# Patient Record
Sex: MALE | Race: WHITE
[De-identification: names, ages, dates, MRNs, and addresses within clinical notes are randomized per-mention and may not be internally consistent; named-entity substitution may affect disease eponyms.]

---

## 2019-01-03 ENCOUNTER — HOSPITAL ENCOUNTER (EMERGENCY)
Dept: HOSPITAL 8 - ED | Age: 54
Discharge: HOME | End: 2019-01-03
Payer: SELF-PAY

## 2019-01-03 VITALS — DIASTOLIC BLOOD PRESSURE: 93 MMHG | SYSTOLIC BLOOD PRESSURE: 145 MMHG

## 2019-01-03 VITALS — WEIGHT: 258.16 LBS | HEIGHT: 74 IN | BODY MASS INDEX: 33.13 KG/M2

## 2019-01-03 DIAGNOSIS — F12.10: ICD-10-CM

## 2019-01-03 DIAGNOSIS — F17.200: ICD-10-CM

## 2019-01-03 DIAGNOSIS — L89.893: Primary | ICD-10-CM

## 2019-01-03 PROCEDURE — 99283 EMERGENCY DEPT VISIT LOW MDM: CPT

## 2019-03-07 ENCOUNTER — HOSPITAL ENCOUNTER (EMERGENCY)
Dept: HOSPITAL 8 - ED | Age: 54
Discharge: HOME | End: 2019-03-07
Payer: MEDICAID

## 2019-03-07 VITALS — HEIGHT: 74 IN | BODY MASS INDEX: 33.84 KG/M2 | WEIGHT: 263.67 LBS

## 2019-03-07 VITALS — SYSTOLIC BLOOD PRESSURE: 140 MMHG | DIASTOLIC BLOOD PRESSURE: 78 MMHG

## 2019-03-07 DIAGNOSIS — E11.65: Primary | ICD-10-CM

## 2019-03-07 DIAGNOSIS — L89.893: ICD-10-CM

## 2019-03-07 DIAGNOSIS — F17.200: ICD-10-CM

## 2019-03-07 LAB
ACETONE, SERUM: (no result) MG/DL
ALBUMIN SERPL-MCNC: 3 G/DL (ref 3.4–5)
ALP SERPL-CCNC: 104 U/L (ref 45–117)
ALT SERPL-CCNC: 27 U/L (ref 12–78)
ANION GAP SERPL CALC-SCNC: 8 MMOL/L (ref 5–15)
BASOPHILS # BLD AUTO: 0.03 X10^3/UL (ref 0–0.1)
BASOPHILS NFR BLD AUTO: 0 % (ref 0–1)
BILIRUB SERPL-MCNC: 0.6 MG/DL (ref 0.2–1)
CALCIUM SERPL-MCNC: 8.3 MG/DL (ref 8.5–10.1)
CHLORIDE SERPL-SCNC: 103 MMOL/L (ref 98–107)
CREAT SERPL-MCNC: 1.14 MG/DL (ref 0.7–1.3)
CULTURE INDICATED?: NO
EOSINOPHIL # BLD AUTO: 0.13 X10^3/UL (ref 0–0.4)
EOSINOPHIL NFR BLD AUTO: 2 % (ref 1–7)
ERYTHROCYTE [DISTWIDTH] IN BLOOD BY AUTOMATED COUNT: 12.9 % (ref 9.4–14.8)
LYMPHOCYTES # BLD AUTO: 1.47 X10^3/UL (ref 1–3.4)
LYMPHOCYTES NFR BLD AUTO: 19 % (ref 22–44)
MCH RBC QN AUTO: 31.4 PG (ref 27.5–34.5)
MCHC RBC AUTO-ENTMCNC: 33.7 G/DL (ref 33.2–36.2)
MCV RBC AUTO: 93.2 FL (ref 81–97)
MD: NO
MICROSCOPIC: (no result)
MONOCYTES # BLD AUTO: 0.52 X10^3/UL (ref 0.2–0.8)
MONOCYTES NFR BLD AUTO: 7 % (ref 2–9)
NEUTROPHILS # BLD AUTO: 5.74 X10^3/UL (ref 1.8–6.8)
NEUTROPHILS NFR BLD AUTO: 73 % (ref 42–75)
O2 FLOW: (no result) L/MIN
PH BLDV: 7.42 PH (ref 7.32–7.42)
PLATELET # BLD AUTO: 169 X10^3/UL (ref 130–400)
PMV BLD AUTO: 8 FL (ref 7.4–10.4)
PROT SERPL-MCNC: 6.8 G/DL (ref 6.4–8.2)
RBC # BLD AUTO: 4.97 X10^6/UL (ref 4.38–5.82)

## 2019-03-07 PROCEDURE — 80053 COMPREHEN METABOLIC PANEL: CPT

## 2019-03-07 PROCEDURE — 82010 KETONE BODYS QUAN: CPT

## 2019-03-07 PROCEDURE — 82803 BLOOD GASES ANY COMBINATION: CPT

## 2019-03-07 PROCEDURE — 99284 EMERGENCY DEPT VISIT MOD MDM: CPT

## 2019-03-07 PROCEDURE — 96372 THER/PROPH/DIAG INJ SC/IM: CPT

## 2019-03-07 PROCEDURE — 85025 COMPLETE CBC W/AUTO DIFF WBC: CPT

## 2019-03-07 PROCEDURE — 82962 GLUCOSE BLOOD TEST: CPT

## 2019-03-07 PROCEDURE — 83930 ASSAY OF BLOOD OSMOLALITY: CPT

## 2019-03-07 PROCEDURE — 81001 URINALYSIS AUTO W/SCOPE: CPT

## 2019-03-07 PROCEDURE — 73630 X-RAY EXAM OF FOOT: CPT

## 2019-03-07 PROCEDURE — 96360 HYDRATION IV INFUSION INIT: CPT

## 2019-03-07 PROCEDURE — 36415 COLL VENOUS BLD VENIPUNCTURE: CPT

## 2019-03-07 NOTE — NUR
R GREAT TOE WITH DEEP WOUND W/ CLEAR DRAINAGE. L GREAT TOE WITH SUPERFICIAL 
WOUND. PT REPORTS 'CHILLS'/N/V 'FIVE NIGHTS AGO', 'I HAVE HAD ULCERS ON MY FEET 
BEFORE SO I WASN'T THAT WORRIED ABOUT IT'. 

PT REPORTS 'I TRIED TO GET IN TO [facility], BUT ITS SO HARD TO SEE ANYONE THERE. I 
APPLIED FOR INSURANCE YESTERDAY SO I SHOULD BE ABLE TO GET BACK ON TOP OF 
CONTROLING MY SUGARS SOON'.

## 2019-03-07 NOTE — NUR
FIRST CONTACT WITH PT. NON-TOXIC APPEARING MALE, SITTING UP IN [facility]. PT 
REPORTS R LOW BACK PAIN RADIATING TO R LEG WITH MOVEMENT X FOUR DAYS. HX OF 
LBP, "THIS FEELS WORSE". DENIES URINARY SYMPTOMS/INCONTINENCE/TRAUMA. PT ALSO 
CO L TOE PAIN/DRAINAGE; HX OF DM W/ PREVIOUS ULCER AND TOE AMPUTATION. 

SO AT BEDSIDE. BP/SPO2 MONITOR IN PLACE.

-------------------------------------------------------------------------------

Addendum: 03/07/19 at 1719 by LWEGENER

-------------------------------------------------------------------------------

PT REPORTS "I KNOW WHY MY SUGARS WERE SO HIGH, I DRANK A PINT LAST NIGHT TO TRY 
TO HELP WITH THE PAIN". 

HX OF DM AND HTN. PT NON-COMPLIANT WITH MEDICATIONS X APPROX 9 MONTHS

## 2019-05-28 ENCOUNTER — HOME HEALTH ADMISSION (OUTPATIENT)
Dept: HOME HEALTH SERVICES | Facility: HOME HEALTHCARE | Age: 54
End: 2019-05-28
Payer: COMMERCIAL

## 2019-05-29 ENCOUNTER — HOME CARE VISIT (OUTPATIENT)
Dept: HOME HEALTH SERVICES | Facility: HOME HEALTHCARE | Age: 54
End: 2019-05-29
Payer: COMMERCIAL

## 2019-05-29 PROCEDURE — G0493 RN CARE EA 15 MIN HH/HOSPICE: HCPCS

## 2019-05-29 PROCEDURE — 665001 SOC-HOME HEALTH

## 2019-05-30 ENCOUNTER — HOME CARE VISIT (OUTPATIENT)
Dept: HOME HEALTH SERVICES | Facility: HOME HEALTHCARE | Age: 54
End: 2019-05-30
Payer: COMMERCIAL

## 2019-05-30 VITALS
TEMPERATURE: 98.6 F | SYSTOLIC BLOOD PRESSURE: 100 MMHG | RESPIRATION RATE: 18 BRPM | DIASTOLIC BLOOD PRESSURE: 60 MMHG | OXYGEN SATURATION: 94 % | HEART RATE: 99 BPM | WEIGHT: 230 LBS | BODY MASS INDEX: 29.52 KG/M2 | HEIGHT: 74 IN

## 2019-05-31 ENCOUNTER — HOME CARE VISIT (OUTPATIENT)
Dept: HOME HEALTH SERVICES | Facility: HOME HEALTHCARE | Age: 54
End: 2019-05-31
Payer: COMMERCIAL

## 2019-05-31 ENCOUNTER — ANTICOAGULATION MONITORING (OUTPATIENT)
Dept: MEDICAL GROUP | Facility: PHYSICIAN GROUP | Age: 54
End: 2019-05-31

## 2019-05-31 PROCEDURE — G0299 HHS/HOSPICE OF RN EA 15 MIN: HCPCS

## 2019-05-31 ASSESSMENT — ENCOUNTER SYMPTOMS
VOMITING: DENIES
DEBILITATING PAIN: 1
NAUSEA: DENIES
MENTAL STATUS CHANGE: 0

## 2019-05-31 ASSESSMENT — PATIENT HEALTH QUESTIONNAIRE - PHQ9
CLINICAL INTERPRETATION OF PHQ2 SCORE: 0
1. LITTLE INTEREST OR PLEASURE IN DOING THINGS: 00
2. FEELING DOWN, DEPRESSED, IRRITABLE, OR HOPELESS: 00

## 2019-05-31 ASSESSMENT — ACTIVITIES OF DAILY LIVING (ADL): OASIS_M1830: 03

## 2019-05-31 NOTE — PROGRESS NOTES
Received referral from [facility]. Medications reviewed. No clinically significant interactions noted.     Combination of ibuprofen with a DOAC should be limited as much as possible.  If continued use of a nonsteroidal anti-inflammatory is needed may want to consider one with a lower bleed risk such as meloxicam or possibly Celebrex.  Other medications for nerve pain and somatic pain will need to be discussed with her physician.

## 2019-06-03 ENCOUNTER — HOME CARE VISIT (OUTPATIENT)
Dept: HOME HEALTH SERVICES | Facility: HOME HEALTHCARE | Age: 54
End: 2019-06-03
Payer: COMMERCIAL

## 2019-06-03 VITALS
RESPIRATION RATE: 18 BRPM | SYSTOLIC BLOOD PRESSURE: 125 MMHG | TEMPERATURE: 98.1 F | DIASTOLIC BLOOD PRESSURE: 70 MMHG | OXYGEN SATURATION: 98 % | HEART RATE: 100 BPM

## 2019-06-03 VITALS
TEMPERATURE: 99.5 F | OXYGEN SATURATION: 98 % | RESPIRATION RATE: 17 BRPM | SYSTOLIC BLOOD PRESSURE: 138 MMHG | DIASTOLIC BLOOD PRESSURE: 70 MMHG | HEART RATE: 88 BPM

## 2019-06-03 VITALS
OXYGEN SATURATION: 98 % | HEART RATE: 88 BPM | RESPIRATION RATE: 17 BRPM | SYSTOLIC BLOOD PRESSURE: 138 MMHG | TEMPERATURE: 99.5 F | DIASTOLIC BLOOD PRESSURE: 90 MMHG

## 2019-06-03 PROCEDURE — G0493 RN CARE EA 15 MIN HH/HOSPICE: HCPCS

## 2019-06-03 PROCEDURE — G0151 HHCP-SERV OF PT,EA 15 MIN: HCPCS

## 2019-06-03 ASSESSMENT — ENCOUNTER SYMPTOMS
MENTAL STATUS CHANGE: 0
DEBILITATING PAIN: 1
VOMITING: DENIES
VOMITING: DENIES
NAUSEA: DENIES
NAUSEA: DENIES

## 2019-06-03 ASSESSMENT — ACTIVITIES OF DAILY LIVING (ADL): ADLS_COMMENTS: <!--EPICS-->PEND OT EVAL<!--EPICE-->

## 2019-06-04 ENCOUNTER — HOME CARE VISIT (OUTPATIENT)
Dept: HOME HEALTH SERVICES | Facility: HOME HEALTHCARE | Age: 54
End: 2019-06-04
Payer: COMMERCIAL

## 2019-06-05 ENCOUNTER — HOME CARE VISIT (OUTPATIENT)
Dept: HOME HEALTH SERVICES | Facility: HOME HEALTHCARE | Age: 54
End: 2019-06-05
Payer: COMMERCIAL

## 2019-06-06 ENCOUNTER — APPOINTMENT (OUTPATIENT)
Dept: RADIOLOGY | Facility: MEDICAL CENTER | Age: 54
DRG: 580 | End: 2019-06-06
Attending: EMERGENCY MEDICINE
Payer: COMMERCIAL

## 2019-06-06 ENCOUNTER — HOSPITAL ENCOUNTER (INPATIENT)
Facility: MEDICAL CENTER | Age: 54
LOS: 7 days | DRG: 580 | End: 2019-06-13
Attending: EMERGENCY MEDICINE | Admitting: HOSPITALIST
Payer: COMMERCIAL

## 2019-06-06 ENCOUNTER — HOME CARE VISIT (OUTPATIENT)
Dept: HOME HEALTH SERVICES | Facility: HOME HEALTHCARE | Age: 54
End: 2019-06-06
Payer: COMMERCIAL

## 2019-06-06 VITALS
HEART RATE: 111 BPM | OXYGEN SATURATION: 98 % | SYSTOLIC BLOOD PRESSURE: 136 MMHG | DIASTOLIC BLOOD PRESSURE: 78 MMHG | RESPIRATION RATE: 16 BRPM | TEMPERATURE: 98 F

## 2019-06-06 DIAGNOSIS — R53.1 WEAKNESS: ICD-10-CM

## 2019-06-06 DIAGNOSIS — L02.419 ABSCESS OF CALF: ICD-10-CM

## 2019-06-06 DIAGNOSIS — M00.9 PYOGENIC ARTHRITIS OF RIGHT KNEE JOINT, DUE TO UNSPECIFIED ORGANISM (HCC): ICD-10-CM

## 2019-06-06 DIAGNOSIS — D51.9 ANEMIA DUE TO VITAMIN B12 DEFICIENCY, UNSPECIFIED B12 DEFICIENCY TYPE: ICD-10-CM

## 2019-06-06 PROBLEM — D64.9 NORMOCHROMIC NORMOCYTIC ANEMIA: Status: ACTIVE | Noted: 2019-06-06

## 2019-06-06 PROBLEM — E11.9 DIABETES MELLITUS TYPE 2 IN NONOBESE (HCC): Status: ACTIVE | Noted: 2019-06-06

## 2019-06-06 PROBLEM — R00.0 TACHYCARDIA: Status: ACTIVE | Noted: 2019-06-06

## 2019-06-06 PROBLEM — Z72.0 TOBACCO ABUSE: Status: ACTIVE | Noted: 2019-06-06

## 2019-06-06 LAB
ALBUMIN SERPL BCP-MCNC: 3.6 G/DL (ref 3.2–4.9)
ALBUMIN/GLOB SERPL: 0.9 G/DL
ALP SERPL-CCNC: 75 U/L (ref 30–99)
ALT SERPL-CCNC: 8 U/L (ref 2–50)
ANION GAP SERPL CALC-SCNC: 10 MMOL/L (ref 0–11.9)
AST SERPL-CCNC: 9 U/L (ref 12–45)
BASOPHILS # BLD AUTO: 0.5 % (ref 0–1.8)
BASOPHILS # BLD: 0.05 K/UL (ref 0–0.12)
BILIRUB SERPL-MCNC: 0.3 MG/DL (ref 0.1–1.5)
BUN SERPL-MCNC: 17 MG/DL (ref 8–22)
CALCIUM SERPL-MCNC: 9.1 MG/DL (ref 8.5–10.5)
CHLORIDE SERPL-SCNC: 104 MMOL/L (ref 96–112)
CO2 SERPL-SCNC: 22 MMOL/L (ref 20–33)
CREAT SERPL-MCNC: 0.81 MG/DL (ref 0.5–1.4)
EKG IMPRESSION: NORMAL
EOSINOPHIL # BLD AUTO: 0.64 K/UL (ref 0–0.51)
EOSINOPHIL NFR BLD: 6.2 % (ref 0–6.9)
ERYTHROCYTE [DISTWIDTH] IN BLOOD BY AUTOMATED COUNT: 51.8 FL (ref 35.9–50)
GLOBULIN SER CALC-MCNC: 3.8 G/DL (ref 1.9–3.5)
GLUCOSE BLD-MCNC: 141 MG/DL (ref 65–99)
GLUCOSE SERPL-MCNC: 210 MG/DL (ref 65–99)
HCT VFR BLD AUTO: 34 % (ref 42–52)
HGB BLD-MCNC: 10.3 G/DL (ref 14–18)
IMM GRANULOCYTES # BLD AUTO: 0.03 K/UL (ref 0–0.11)
IMM GRANULOCYTES NFR BLD AUTO: 0.3 % (ref 0–0.9)
LACTATE BLD-SCNC: 1.6 MMOL/L (ref 0.5–2)
LYMPHOCYTES # BLD AUTO: 1.64 K/UL (ref 1–4.8)
LYMPHOCYTES NFR BLD: 15.8 % (ref 22–41)
MCH RBC QN AUTO: 27.7 PG (ref 27–33)
MCHC RBC AUTO-ENTMCNC: 30.3 G/DL (ref 33.7–35.3)
MCV RBC AUTO: 91.4 FL (ref 81.4–97.8)
MONOCYTES # BLD AUTO: 0.46 K/UL (ref 0–0.85)
MONOCYTES NFR BLD AUTO: 4.4 % (ref 0–13.4)
NEUTROPHILS # BLD AUTO: 7.58 K/UL (ref 1.82–7.42)
NEUTROPHILS NFR BLD: 72.8 % (ref 44–72)
NRBC # BLD AUTO: 0 K/UL
NRBC BLD-RTO: 0 /100 WBC
PLATELET # BLD AUTO: 426 K/UL (ref 164–446)
PMV BLD AUTO: 9.1 FL (ref 9–12.9)
POTASSIUM SERPL-SCNC: 4.1 MMOL/L (ref 3.6–5.5)
PROT SERPL-MCNC: 7.4 G/DL (ref 6–8.2)
RBC # BLD AUTO: 3.72 M/UL (ref 4.7–6.1)
SODIUM SERPL-SCNC: 136 MMOL/L (ref 135–145)
WBC # BLD AUTO: 10.4 K/UL (ref 4.8–10.8)

## 2019-06-06 PROCEDURE — 99407 BEHAV CHNG SMOKING > 10 MIN: CPT | Performed by: HOSPITALIST

## 2019-06-06 PROCEDURE — 82962 GLUCOSE BLOOD TEST: CPT | Mod: 91

## 2019-06-06 PROCEDURE — 80053 COMPREHEN METABOLIC PANEL: CPT

## 2019-06-06 PROCEDURE — 700111 HCHG RX REV CODE 636 W/ 250 OVERRIDE (IP): Performed by: EMERGENCY MEDICINE

## 2019-06-06 PROCEDURE — 770006 HCHG ROOM/CARE - MED/SURG/GYN SEMI*

## 2019-06-06 PROCEDURE — 700105 HCHG RX REV CODE 258: Performed by: EMERGENCY MEDICINE

## 2019-06-06 PROCEDURE — 96367 TX/PROPH/DG ADDL SEQ IV INF: CPT

## 2019-06-06 PROCEDURE — 99285 EMERGENCY DEPT VISIT HI MDM: CPT

## 2019-06-06 PROCEDURE — 85025 COMPLETE CBC W/AUTO DIFF WBC: CPT

## 2019-06-06 PROCEDURE — 700105 HCHG RX REV CODE 258: Performed by: HOSPITALIST

## 2019-06-06 PROCEDURE — 93971 EXTREMITY STUDY: CPT | Mod: RT

## 2019-06-06 PROCEDURE — G0493 RN CARE EA 15 MIN HH/HOSPICE: HCPCS

## 2019-06-06 PROCEDURE — 700111 HCHG RX REV CODE 636 W/ 250 OVERRIDE (IP): Performed by: HOSPITALIST

## 2019-06-06 PROCEDURE — 87040 BLOOD CULTURE FOR BACTERIA: CPT | Mod: 91

## 2019-06-06 PROCEDURE — 700117 HCHG RX CONTRAST REV CODE 255: Performed by: EMERGENCY MEDICINE

## 2019-06-06 PROCEDURE — 93005 ELECTROCARDIOGRAM TRACING: CPT

## 2019-06-06 PROCEDURE — 83605 ASSAY OF LACTIC ACID: CPT

## 2019-06-06 PROCEDURE — 73701 CT LOWER EXTREMITY W/DYE: CPT | Mod: RT

## 2019-06-06 PROCEDURE — 96365 THER/PROPH/DIAG IV INF INIT: CPT

## 2019-06-06 PROCEDURE — 96375 TX/PRO/DX INJ NEW DRUG ADDON: CPT

## 2019-06-06 PROCEDURE — 700102 HCHG RX REV CODE 250 W/ 637 OVERRIDE(OP): Performed by: HOSPITALIST

## 2019-06-06 PROCEDURE — 36415 COLL VENOUS BLD VENIPUNCTURE: CPT

## 2019-06-06 PROCEDURE — 73552 X-RAY EXAM OF FEMUR 2/>: CPT | Mod: RT

## 2019-06-06 PROCEDURE — A9270 NON-COVERED ITEM OR SERVICE: HCPCS | Performed by: HOSPITALIST

## 2019-06-06 PROCEDURE — 99223 1ST HOSP IP/OBS HIGH 75: CPT | Mod: 25 | Performed by: HOSPITALIST

## 2019-06-06 RX ORDER — BISACODYL 10 MG
10 SUPPOSITORY, RECTAL RECTAL
Status: DISCONTINUED | OUTPATIENT
Start: 2019-06-06 | End: 2019-06-13 | Stop reason: HOSPADM

## 2019-06-06 RX ORDER — AMOXICILLIN 250 MG
2 CAPSULE ORAL 2 TIMES DAILY
Status: DISCONTINUED | OUTPATIENT
Start: 2019-06-07 | End: 2019-06-13 | Stop reason: HOSPADM

## 2019-06-06 RX ORDER — LABETALOL HYDROCHLORIDE 5 MG/ML
10 INJECTION, SOLUTION INTRAVENOUS EVERY 4 HOURS PRN
Status: DISCONTINUED | OUTPATIENT
Start: 2019-06-06 | End: 2019-06-13 | Stop reason: HOSPADM

## 2019-06-06 RX ORDER — ONDANSETRON 2 MG/ML
4 INJECTION INTRAMUSCULAR; INTRAVENOUS EVERY 4 HOURS PRN
Status: DISCONTINUED | OUTPATIENT
Start: 2019-06-06 | End: 2019-06-13 | Stop reason: HOSPADM

## 2019-06-06 RX ORDER — POLYETHYLENE GLYCOL 3350 17 G/17G
1 POWDER, FOR SOLUTION ORAL
Status: DISCONTINUED | OUTPATIENT
Start: 2019-06-06 | End: 2019-06-13 | Stop reason: HOSPADM

## 2019-06-06 RX ORDER — GABAPENTIN 300 MG/1
600 CAPSULE ORAL 3 TIMES DAILY
Status: DISCONTINUED | OUTPATIENT
Start: 2019-06-06 | End: 2019-06-13 | Stop reason: HOSPADM

## 2019-06-06 RX ORDER — MORPHINE SULFATE 15 MG/1
15 TABLET, FILM COATED, EXTENDED RELEASE ORAL ONCE
Status: COMPLETED | OUTPATIENT
Start: 2019-06-06 | End: 2019-06-06

## 2019-06-06 RX ORDER — PROMETHAZINE HYDROCHLORIDE 12.5 MG/1
12.5-25 SUPPOSITORY RECTAL EVERY 4 HOURS PRN
Status: DISCONTINUED | OUTPATIENT
Start: 2019-06-06 | End: 2019-06-13 | Stop reason: HOSPADM

## 2019-06-06 RX ORDER — MORPHINE SULFATE 15 MG/1
15 TABLET, FILM COATED, EXTENDED RELEASE ORAL EVERY 12 HOURS
Status: DISCONTINUED | OUTPATIENT
Start: 2019-06-06 | End: 2019-06-13 | Stop reason: HOSPADM

## 2019-06-06 RX ORDER — NICOTINE 21 MG/24HR
21 PATCH, TRANSDERMAL 24 HOURS TRANSDERMAL
Status: DISCONTINUED | OUTPATIENT
Start: 2019-06-06 | End: 2019-06-13 | Stop reason: HOSPADM

## 2019-06-06 RX ORDER — MORPHINE SULFATE 4 MG/ML
4 INJECTION, SOLUTION INTRAMUSCULAR; INTRAVENOUS ONCE
Status: COMPLETED | OUTPATIENT
Start: 2019-06-06 | End: 2019-06-06

## 2019-06-06 RX ORDER — OXYCODONE AND ACETAMINOPHEN 10; 325 MG/1; MG/1
1 TABLET ORAL EVERY 4 HOURS PRN
Status: DISCONTINUED | OUTPATIENT
Start: 2019-06-06 | End: 2019-06-10

## 2019-06-06 RX ORDER — PROMETHAZINE HYDROCHLORIDE 25 MG/1
12.5-25 TABLET ORAL EVERY 4 HOURS PRN
Status: DISCONTINUED | OUTPATIENT
Start: 2019-06-06 | End: 2019-06-13 | Stop reason: HOSPADM

## 2019-06-06 RX ORDER — LISINOPRIL 20 MG/1
20 TABLET ORAL DAILY
Status: DISCONTINUED | OUTPATIENT
Start: 2019-06-07 | End: 2019-06-13 | Stop reason: HOSPADM

## 2019-06-06 RX ORDER — OXYCODONE AND ACETAMINOPHEN 10; 325 MG/1; MG/1
1 TABLET ORAL EVERY 4 HOURS PRN
Status: ON HOLD | COMMUNITY
End: 2019-06-13

## 2019-06-06 RX ORDER — ACETAMINOPHEN 325 MG/1
650 TABLET ORAL EVERY 6 HOURS PRN
Status: DISCONTINUED | OUTPATIENT
Start: 2019-06-06 | End: 2019-06-09

## 2019-06-06 RX ORDER — SODIUM CHLORIDE 9 MG/ML
INJECTION, SOLUTION INTRAVENOUS CONTINUOUS
Status: DISCONTINUED | OUTPATIENT
Start: 2019-06-06 | End: 2019-06-09

## 2019-06-06 RX ORDER — ONDANSETRON 2 MG/ML
4 INJECTION INTRAMUSCULAR; INTRAVENOUS ONCE
Status: COMPLETED | OUTPATIENT
Start: 2019-06-06 | End: 2019-06-06

## 2019-06-06 RX ORDER — ONDANSETRON 4 MG/1
4 TABLET, ORALLY DISINTEGRATING ORAL EVERY 4 HOURS PRN
Status: DISCONTINUED | OUTPATIENT
Start: 2019-06-06 | End: 2019-06-13 | Stop reason: HOSPADM

## 2019-06-06 RX ADMIN — ONDANSETRON 4 MG: 2 INJECTION INTRAMUSCULAR; INTRAVENOUS at 16:04

## 2019-06-06 RX ADMIN — IOHEXOL 100 ML: 350 INJECTION, SOLUTION INTRAVENOUS at 18:07

## 2019-06-06 RX ADMIN — VANCOMYCIN HYDROCHLORIDE 2600 MG: 100 INJECTION, POWDER, LYOPHILIZED, FOR SOLUTION INTRAVENOUS at 17:39

## 2019-06-06 RX ADMIN — MORPHINE SULFATE 4 MG: 4 INJECTION INTRAVENOUS at 16:05

## 2019-06-06 RX ADMIN — PIPERACILLIN AND TAZOBACTAM 3.38 G: 3; .375 INJECTION, POWDER, LYOPHILIZED, FOR SOLUTION INTRAVENOUS; PARENTERAL at 16:56

## 2019-06-06 RX ADMIN — MORPHINE SULFATE 15 MG: 15 TABLET, EXTENDED RELEASE ORAL at 19:44

## 2019-06-06 RX ADMIN — OXYCODONE HYDROCHLORIDE AND ACETAMINOPHEN 1 TABLET: 10; 325 TABLET ORAL at 21:43

## 2019-06-06 RX ADMIN — OXYCODONE HYDROCHLORIDE AND ACETAMINOPHEN 1 TABLET: 10; 325 TABLET ORAL at 17:34

## 2019-06-06 RX ADMIN — NICOTINE 21 MG: 21 PATCH, EXTENDED RELEASE TRANSDERMAL at 19:09

## 2019-06-06 RX ADMIN — SODIUM CHLORIDE: 9 INJECTION, SOLUTION INTRAVENOUS at 21:38

## 2019-06-06 RX ADMIN — CEFTRIAXONE 2 G: 2 INJECTION, POWDER, FOR SOLUTION INTRAMUSCULAR; INTRAVENOUS at 21:00

## 2019-06-06 RX ADMIN — GABAPENTIN 600 MG: 300 CAPSULE ORAL at 19:08

## 2019-06-06 ASSESSMENT — ENCOUNTER SYMPTOMS
MENTAL STATUS CHANGE: 0
DIARRHEA: 0
LOSS OF CONSCIOUSNESS: 0
GASTROINTESTINAL NEGATIVE: 1
CARDIOVASCULAR NEGATIVE: 1
VOMITING: 0
FEVER: 0
PALPITATIONS: 0
BLOOD IN STOOL: 0
PSYCHIATRIC NEGATIVE: 1
NEUROLOGICAL NEGATIVE: 1
CHILLS: 0
COUGH: 0
ABDOMINAL PAIN: 0
RESPIRATORY NEGATIVE: 1
EYES NEGATIVE: 1
BRUISES/BLEEDS EASILY: 0
DIAPHORESIS: 0
DIZZINESS: 0
HEARTBURN: 0
FOCAL WEAKNESS: 0
CONSTIPATION: 0
CONSTITUTIONAL NEGATIVE: 1
SEIZURES: 0
HEADACHES: 0
WHEEZING: 0
SHORTNESS OF BREATH: 0
DOUBLE VISION: 0
HEMOPTYSIS: 0
MYALGIAS: 1
NERVOUS/ANXIOUS: 0
NAUSEA: 0

## 2019-06-06 ASSESSMENT — PAIN DESCRIPTION - DESCRIPTORS: DESCRIPTORS: PRESSURE;SHOOTING

## 2019-06-06 ASSESSMENT — COGNITIVE AND FUNCTIONAL STATUS - GENERAL
STANDING UP FROM CHAIR USING ARMS: A LITTLE
CLIMB 3 TO 5 STEPS WITH RAILING: A LITTLE
DAILY ACTIVITIY SCORE: 24
SUGGESTED CMS G CODE MODIFIER MOBILITY: CJ
WALKING IN HOSPITAL ROOM: A LITTLE
MOVING FROM LYING ON BACK TO SITTING ON SIDE OF FLAT BED: A LITTLE
SUGGESTED CMS G CODE MODIFIER DAILY ACTIVITY: CH
MOBILITY SCORE: 20

## 2019-06-06 ASSESSMENT — LIFESTYLE VARIABLES
ALCOHOL_USE: NO
EVER_SMOKED: YES

## 2019-06-06 ASSESSMENT — PATIENT HEALTH QUESTIONNAIRE - PHQ9
SUM OF ALL RESPONSES TO PHQ9 QUESTIONS 1 AND 2: 0
2. FEELING DOWN, DEPRESSED, IRRITABLE, OR HOPELESS: NOT AT ALL
1. LITTLE INTEREST OR PLEASURE IN DOING THINGS: NOT AT ALL

## 2019-06-06 NOTE — ED NOTES
Med rec complete per medication list (returned)  Allergies reviewed    Patient stated he was at [facility] and had a pic line and received antibiotics    Patient was discharged with Levemir and Novolog but stated he doesn't use them due to not being trained on how to use it.     Out of both pain medications for a week, hasn't had a follow up appointment    Gabapentin 300 mg is prescribed TID but patient has been taking it as 600 mg TID due to pain being worse

## 2019-06-06 NOTE — ED TRIAGE NOTES
.  Chief Complaint   Patient presents with   • Leg Pain     right leg worsening pain and swelling, hx blood clot to rle 2 months on eliquis   • Leg Swelling     with redness   pt to triage with family in w/c. Pt limited ambulation with crutches. Pt advised by home health RN to come to ed for evaluation of increased swelling pain and redness. Pt has hinge knee brace d/t fx in femur. Was discharged from [facility] on 5/25 has since ran out of pain medication.

## 2019-06-07 ENCOUNTER — ANESTHESIA EVENT (OUTPATIENT)
Dept: SURGERY | Facility: MEDICAL CENTER | Age: 54
DRG: 580 | End: 2019-06-07
Payer: COMMERCIAL

## 2019-06-07 ENCOUNTER — ANESTHESIA (OUTPATIENT)
Dept: SURGERY | Facility: MEDICAL CENTER | Age: 54
DRG: 580 | End: 2019-06-07
Payer: COMMERCIAL

## 2019-06-07 PROBLEM — G89.29 CHRONIC BACK PAIN: Status: ACTIVE | Noted: 2019-06-07

## 2019-06-07 PROBLEM — F17.200 TOBACCO DEPENDENCE: Status: ACTIVE | Noted: 2019-06-06

## 2019-06-07 PROBLEM — I10 HYPERTENSION: Status: ACTIVE | Noted: 2019-06-07

## 2019-06-07 PROBLEM — M54.9 CHRONIC BACK PAIN: Status: ACTIVE | Noted: 2019-06-07

## 2019-06-07 PROBLEM — I82.409 DVT (DEEP VENOUS THROMBOSIS) (HCC): Status: ACTIVE | Noted: 2019-06-07

## 2019-06-07 LAB
ANION GAP SERPL CALC-SCNC: 7 MMOL/L (ref 0–11.9)
APPEARANCE FLD: NORMAL
BODY FLD TYPE: NORMAL
BUN SERPL-MCNC: 15 MG/DL (ref 8–22)
CALCIUM SERPL-MCNC: 8.1 MG/DL (ref 8.5–10.5)
CHLORIDE SERPL-SCNC: 106 MMOL/L (ref 96–112)
CO2 SERPL-SCNC: 22 MMOL/L (ref 20–33)
COLOR FLD: YELLOW
CREAT SERPL-MCNC: 0.71 MG/DL (ref 0.5–1.4)
CRYSTALS FLD MICRO: NORMAL
EOSINOPHIL NFR FLD: 6 %
ERYTHROCYTE [DISTWIDTH] IN BLOOD BY AUTOMATED COUNT: 53 FL (ref 35.9–50)
GLUCOSE BLD-MCNC: 126 MG/DL (ref 65–99)
GLUCOSE BLD-MCNC: 133 MG/DL (ref 65–99)
GLUCOSE BLD-MCNC: 164 MG/DL (ref 65–99)
GLUCOSE BLD-MCNC: 184 MG/DL (ref 65–99)
GLUCOSE SERPL-MCNC: 149 MG/DL (ref 65–99)
GRAM STN SPEC: NORMAL
GRAM STN SPEC: NORMAL
HCT VFR BLD AUTO: 27.8 % (ref 42–52)
HGB BLD-MCNC: 8.3 G/DL (ref 14–18)
LYMPHOCYTES NFR FLD: 18 %
MCH RBC QN AUTO: 27.6 PG (ref 27–33)
MCHC RBC AUTO-ENTMCNC: 29.9 G/DL (ref 33.7–35.3)
MCV RBC AUTO: 92.4 FL (ref 81.4–97.8)
MONONUC CELLS NFR FLD: 32 %
NEUTROPHILS NFR FLD: 44 %
PLATELET # BLD AUTO: 294 K/UL (ref 164–446)
PMV BLD AUTO: 9.1 FL (ref 9–12.9)
POTASSIUM SERPL-SCNC: 3.8 MMOL/L (ref 3.6–5.5)
RBC # BLD AUTO: 3.01 M/UL (ref 4.7–6.1)
RBC # FLD: NORMAL CELLS/UL
SIGNIFICANT IND 70042: NORMAL
SIGNIFICANT IND 70042: NORMAL
SITE SITE: NORMAL
SITE SITE: NORMAL
SODIUM SERPL-SCNC: 135 MMOL/L (ref 135–145)
SOURCE SOURCE: NORMAL
SOURCE SOURCE: NORMAL
WBC # BLD AUTO: 7.7 K/UL (ref 4.8–10.8)
WBC # FLD: 6116 CELLS/UL

## 2019-06-07 PROCEDURE — 700102 HCHG RX REV CODE 250 W/ 637 OVERRIDE(OP): Performed by: ANESTHESIOLOGY

## 2019-06-07 PROCEDURE — 700111 HCHG RX REV CODE 636 W/ 250 OVERRIDE (IP): Performed by: HOSPITALIST

## 2019-06-07 PROCEDURE — 160002 HCHG RECOVERY MINUTES (STAT): Performed by: ORTHOPAEDIC SURGERY

## 2019-06-07 PROCEDURE — 501330 HCHG SET, CYSTO IRRIG TUBING: Performed by: ORTHOPAEDIC SURGERY

## 2019-06-07 PROCEDURE — 700101 HCHG RX REV CODE 250: Performed by: ANESTHESIOLOGY

## 2019-06-07 PROCEDURE — 500440 HCHG DRESSING, STERILE ROLL (KERLIX): Performed by: ORTHOPAEDIC SURGERY

## 2019-06-07 PROCEDURE — 700111 HCHG RX REV CODE 636 W/ 250 OVERRIDE (IP): Performed by: ANESTHESIOLOGY

## 2019-06-07 PROCEDURE — 501838 HCHG SUTURE GENERAL: Performed by: ORTHOPAEDIC SURGERY

## 2019-06-07 PROCEDURE — 700102 HCHG RX REV CODE 250 W/ 637 OVERRIDE(OP): Performed by: HOSPITALIST

## 2019-06-07 PROCEDURE — 0K9S0ZZ DRAINAGE OF RIGHT LOWER LEG MUSCLE, OPEN APPROACH: ICD-10-PCS | Performed by: ORTHOPAEDIC SURGERY

## 2019-06-07 PROCEDURE — 160027 HCHG SURGERY MINUTES - 1ST 30 MINS LEVEL 2: Performed by: ORTHOPAEDIC SURGERY

## 2019-06-07 PROCEDURE — 82962 GLUCOSE BLOOD TEST: CPT

## 2019-06-07 PROCEDURE — 0KDS0ZZ EXTRACTION OF RIGHT LOWER LEG MUSCLE, OPEN APPROACH: ICD-10-PCS | Performed by: ORTHOPAEDIC SURGERY

## 2019-06-07 PROCEDURE — A9270 NON-COVERED ITEM OR SERVICE: HCPCS | Performed by: ANESTHESIOLOGY

## 2019-06-07 PROCEDURE — 160036 HCHG PACU - EA ADDL 30 MINS PHASE I: Performed by: ORTHOPAEDIC SURGERY

## 2019-06-07 PROCEDURE — 160048 HCHG OR STATISTICAL LEVEL 1-5: Performed by: ORTHOPAEDIC SURGERY

## 2019-06-07 PROCEDURE — 87186 SC STD MICRODIL/AGAR DIL: CPT

## 2019-06-07 PROCEDURE — A9270 NON-COVERED ITEM OR SERVICE: HCPCS | Performed by: HOSPITALIST

## 2019-06-07 PROCEDURE — 87070 CULTURE OTHR SPECIMN AEROBIC: CPT

## 2019-06-07 PROCEDURE — 36415 COLL VENOUS BLD VENIPUNCTURE: CPT

## 2019-06-07 PROCEDURE — 160009 HCHG ANES TIME/MIN: Performed by: ORTHOPAEDIC SURGERY

## 2019-06-07 PROCEDURE — A6454 SELF-ADHER BAND W>=3" <5"/YD: HCPCS | Performed by: ORTHOPAEDIC SURGERY

## 2019-06-07 PROCEDURE — 80048 BASIC METABOLIC PNL TOTAL CA: CPT

## 2019-06-07 PROCEDURE — 700105 HCHG RX REV CODE 258: Performed by: HOSPITALIST

## 2019-06-07 PROCEDURE — 500423 HCHG DRESSING, ABD COMBINE: Performed by: ORTHOPAEDIC SURGERY

## 2019-06-07 PROCEDURE — 0S9C3ZX DRAINAGE OF RIGHT KNEE JOINT, PERCUTANEOUS APPROACH, DIAGNOSTIC: ICD-10-PCS | Performed by: ORTHOPAEDIC SURGERY

## 2019-06-07 PROCEDURE — 99233 SBSQ HOSP IP/OBS HIGH 50: CPT | Performed by: INTERNAL MEDICINE

## 2019-06-07 PROCEDURE — 89051 BODY FLUID CELL COUNT: CPT

## 2019-06-07 PROCEDURE — 85027 COMPLETE CBC AUTOMATED: CPT

## 2019-06-07 PROCEDURE — 700105 HCHG RX REV CODE 258: Performed by: ANESTHESIOLOGY

## 2019-06-07 PROCEDURE — 770006 HCHG ROOM/CARE - MED/SURG/GYN SEMI*

## 2019-06-07 PROCEDURE — 89060 EXAM SYNOVIAL FLUID CRYSTALS: CPT

## 2019-06-07 PROCEDURE — 87205 SMEAR GRAM STAIN: CPT | Mod: 91

## 2019-06-07 PROCEDURE — 501445 HCHG STAPLER, SKIN DISP: Performed by: ORTHOPAEDIC SURGERY

## 2019-06-07 PROCEDURE — 160035 HCHG PACU - 1ST 60 MINS PHASE I: Performed by: ORTHOPAEDIC SURGERY

## 2019-06-07 PROCEDURE — 87077 CULTURE AEROBIC IDENTIFY: CPT

## 2019-06-07 PROCEDURE — 500881 HCHG PACK, EXTREMITY: Performed by: ORTHOPAEDIC SURGERY

## 2019-06-07 PROCEDURE — A6223 GAUZE >16<=48 NO W/SAL W/O B: HCPCS | Performed by: ORTHOPAEDIC SURGERY

## 2019-06-07 PROCEDURE — 160038 HCHG SURGERY MINUTES - EA ADDL 1 MIN LEVEL 2: Performed by: ORTHOPAEDIC SURGERY

## 2019-06-07 PROCEDURE — 87075 CULTR BACTERIA EXCEPT BLOOD: CPT

## 2019-06-07 RX ORDER — MEPERIDINE HYDROCHLORIDE 25 MG/ML
6.25 INJECTION INTRAMUSCULAR; INTRAVENOUS; SUBCUTANEOUS
Status: DISCONTINUED | OUTPATIENT
Start: 2019-06-07 | End: 2019-06-07 | Stop reason: HOSPADM

## 2019-06-07 RX ORDER — ONDANSETRON 2 MG/ML
4 INJECTION INTRAMUSCULAR; INTRAVENOUS
Status: DISCONTINUED | OUTPATIENT
Start: 2019-06-07 | End: 2019-06-07 | Stop reason: HOSPADM

## 2019-06-07 RX ORDER — HALOPERIDOL 5 MG/ML
1 INJECTION INTRAMUSCULAR
Status: DISCONTINUED | OUTPATIENT
Start: 2019-06-07 | End: 2019-06-07 | Stop reason: HOSPADM

## 2019-06-07 RX ORDER — OXYCODONE HCL 5 MG/5 ML
10 SOLUTION, ORAL ORAL
Status: COMPLETED | OUTPATIENT
Start: 2019-06-07 | End: 2019-06-07

## 2019-06-07 RX ORDER — SODIUM CHLORIDE, SODIUM LACTATE, POTASSIUM CHLORIDE, CALCIUM CHLORIDE 600; 310; 30; 20 MG/100ML; MG/100ML; MG/100ML; MG/100ML
INJECTION, SOLUTION INTRAVENOUS
Status: DISCONTINUED | OUTPATIENT
Start: 2019-06-07 | End: 2019-06-07 | Stop reason: SURG

## 2019-06-07 RX ORDER — DIPHENHYDRAMINE HYDROCHLORIDE 50 MG/ML
12.5 INJECTION INTRAMUSCULAR; INTRAVENOUS
Status: DISCONTINUED | OUTPATIENT
Start: 2019-06-07 | End: 2019-06-07 | Stop reason: HOSPADM

## 2019-06-07 RX ORDER — SODIUM CHLORIDE, SODIUM LACTATE, POTASSIUM CHLORIDE, CALCIUM CHLORIDE 600; 310; 30; 20 MG/100ML; MG/100ML; MG/100ML; MG/100ML
INJECTION, SOLUTION INTRAVENOUS CONTINUOUS
Status: DISCONTINUED | OUTPATIENT
Start: 2019-06-07 | End: 2019-06-07 | Stop reason: HOSPADM

## 2019-06-07 RX ORDER — OXYCODONE HCL 5 MG/5 ML
5 SOLUTION, ORAL ORAL
Status: COMPLETED | OUTPATIENT
Start: 2019-06-07 | End: 2019-06-07

## 2019-06-07 RX ADMIN — PROPOFOL 200 MG: 10 INJECTION, EMULSION INTRAVENOUS at 11:04

## 2019-06-07 RX ADMIN — NICOTINE 21 MG: 21 PATCH, EXTENDED RELEASE TRANSDERMAL at 05:37

## 2019-06-07 RX ADMIN — INSULIN HUMAN 2 UNITS: 100 INJECTION, SOLUTION PARENTERAL at 17:54

## 2019-06-07 RX ADMIN — MIDAZOLAM HYDROCHLORIDE 2 MG: 1 INJECTION, SOLUTION INTRAMUSCULAR; INTRAVENOUS at 10:50

## 2019-06-07 RX ADMIN — SODIUM CHLORIDE, POTASSIUM CHLORIDE, SODIUM LACTATE AND CALCIUM CHLORIDE: 600; 310; 30; 20 INJECTION, SOLUTION INTRAVENOUS at 10:56

## 2019-06-07 RX ADMIN — SODIUM CHLORIDE, POTASSIUM CHLORIDE, SODIUM LACTATE AND CALCIUM CHLORIDE 1000 ML: 600; 310; 30; 20 INJECTION, SOLUTION INTRAVENOUS at 12:45

## 2019-06-07 RX ADMIN — MORPHINE SULFATE 15 MG: 15 TABLET, EXTENDED RELEASE ORAL at 05:37

## 2019-06-07 RX ADMIN — MORPHINE SULFATE 15 MG: 15 TABLET, EXTENDED RELEASE ORAL at 17:02

## 2019-06-07 RX ADMIN — GABAPENTIN 600 MG: 300 CAPSULE ORAL at 05:36

## 2019-06-07 RX ADMIN — FENTANYL CITRATE 100 MCG: 50 INJECTION, SOLUTION INTRAMUSCULAR; INTRAVENOUS at 11:04

## 2019-06-07 RX ADMIN — CEFTRIAXONE 2 G: 2 INJECTION, POWDER, FOR SOLUTION INTRAMUSCULAR; INTRAVENOUS at 17:02

## 2019-06-07 RX ADMIN — OXYCODONE HYDROCHLORIDE AND ACETAMINOPHEN 1 TABLET: 10; 325 TABLET ORAL at 01:44

## 2019-06-07 RX ADMIN — FENTANYL CITRATE 50 MCG: 50 INJECTION INTRAMUSCULAR; INTRAVENOUS at 12:22

## 2019-06-07 RX ADMIN — MEPERIDINE HYDROCHLORIDE 6.25 MG: 25 INJECTION INTRAMUSCULAR; INTRAVENOUS; SUBCUTANEOUS at 12:41

## 2019-06-07 RX ADMIN — OXYCODONE HYDROCHLORIDE AND ACETAMINOPHEN 1 TABLET: 10; 325 TABLET ORAL at 23:46

## 2019-06-07 RX ADMIN — SODIUM CHLORIDE: 9 INJECTION, SOLUTION INTRAVENOUS at 13:44

## 2019-06-07 RX ADMIN — ONDANSETRON 4 MG: 2 INJECTION INTRAMUSCULAR; INTRAVENOUS at 11:20

## 2019-06-07 RX ADMIN — LIDOCAINE HYDROCHLORIDE 50 MG: 20 INJECTION, SOLUTION INTRAVENOUS at 11:04

## 2019-06-07 RX ADMIN — OXYCODONE HYDROCHLORIDE AND ACETAMINOPHEN 1 TABLET: 10; 325 TABLET ORAL at 19:39

## 2019-06-07 RX ADMIN — FENTANYL CITRATE 50 MCG: 50 INJECTION INTRAMUSCULAR; INTRAVENOUS at 12:24

## 2019-06-07 RX ADMIN — OXYCODONE HYDROCHLORIDE AND ACETAMINOPHEN 1 TABLET: 10; 325 TABLET ORAL at 15:18

## 2019-06-07 RX ADMIN — OXYCODONE HYDROCHLORIDE AND ACETAMINOPHEN 1 TABLET: 10; 325 TABLET ORAL at 07:07

## 2019-06-07 RX ADMIN — VANCOMYCIN HYDROCHLORIDE 1800 MG: 100 INJECTION, POWDER, LYOPHILIZED, FOR SOLUTION INTRAVENOUS at 17:49

## 2019-06-07 RX ADMIN — LISINOPRIL 20 MG: 20 TABLET ORAL at 05:37

## 2019-06-07 RX ADMIN — VANCOMYCIN HYDROCHLORIDE 1800 MG: 100 INJECTION, POWDER, LYOPHILIZED, FOR SOLUTION INTRAVENOUS at 05:40

## 2019-06-07 RX ADMIN — OXYCODONE HYDROCHLORIDE 10 MG: 5 SOLUTION ORAL at 12:32

## 2019-06-07 RX ADMIN — GABAPENTIN 600 MG: 300 CAPSULE ORAL at 17:02

## 2019-06-07 RX ADMIN — MEPERIDINE HYDROCHLORIDE 6.25 MG: 25 INJECTION INTRAMUSCULAR; INTRAVENOUS; SUBCUTANEOUS at 12:45

## 2019-06-07 ASSESSMENT — ENCOUNTER SYMPTOMS
COUGH: 0
ABDOMINAL PAIN: 0
SINUS PAIN: 0
SENSORY CHANGE: 0
FEVER: 0
SHORTNESS OF BREATH: 0
SORE THROAT: 0
NAUSEA: 0
CONSTIPATION: 0
WEAKNESS: 0
INSOMNIA: 0
DEPRESSION: 0
VOMITING: 0
WHEEZING: 0
DIZZINESS: 0
PALPITATIONS: 0
BRUISES/BLEEDS EASILY: 0
SPEECH CHANGE: 0
DIARRHEA: 0
TINGLING: 1
CHILLS: 0
HEADACHES: 0
NERVOUS/ANXIOUS: 0
FOCAL WEAKNESS: 0

## 2019-06-07 ASSESSMENT — PAIN SCALES - GENERAL: PAIN_LEVEL: 3

## 2019-06-07 NOTE — ASSESSMENT & PLAN NOTE
S/p I&D 6/7 with hemovac placement.   Cultures + MSSA. Covering with IV ancef while inpatient. Will transition to PO augmentin [X2 Weeks] at discharge.   Ortho planning for hemovac d/c once drainage < 30 cc/24h.

## 2019-06-07 NOTE — PROGRESS NOTES
Transport here to take pt. To PreOp. Pt. Had a couple questions about surgery so consents were not signed.

## 2019-06-07 NOTE — PROGRESS NOTES
Hospital Medicine Daily Progress Note    Date of Service  6/7/2019    Chief Complaint  Right leg pain, swelling, redness    Hospital Course   Mr. Pérez is a 53-year-old male who presented to the emergency department on 6/6/2019 with complaints of worsening right leg pain, swelling, and erythema. Of note, he was diagnosed with a blood clot approximately 2 months ago and has been maintained on Eliquis in addition to being in a hinged knee brace due to femur fracture. He was advised by his home health RN to come into the hospital for evaluation of his leg.  Ultrasound of the affected leg showed a calf mass most consistent with deep abscess so the patient was placed on IV ceftriaxone and vancomycin, and orthopedic surgery was consulted.  A CT was done and showed a 15.9 cm abscess in the posterior upper calf with associated moderate right knee effusion and synovial hyperenhancement consistent with septic arthritis. On 6/7/19 he was taken for I&D of the right leg by Dr. Harris. Synovial fluid was sent for analysis and cultures are pending. Infectious disease was also consulted.       Interval Problem Update  Seen postoperatively. S/p I&D right leg today. Surgical dressing in place to RLE so was unable to visualize the leg. Hemovac drain present, small amount of serosang drainage present. Complains of severe pain in his right leg, hasn't had pain meds since returning from the operating room. Bedside RN notified.     No leukocytosis on morning labs H/H 10.3->8.3. BMP unremarkable.   -164.    Afebrile overnight, HR 80s-90s, BP 100s-teens, O2 saturations % on 2 L/min of oxygen via nasal cannula.    Intraoperative culture was sent & is pending.   Blood cultures in process.    WBAT RLE.     PT/OT pending.     Consultants/Specialty  Orthopedic surgery  Infectious disease    Code Status  Full code    Disposition  Clinical for now.  Recently discharged from [facility] with outpatient physical therapy and home  health.  Adamantly refusing to return to [facility] if SNF is ultimately recommended.     Review of Systems  Review of Systems   Constitutional: Negative for chills, fever and malaise/fatigue.   HENT: Negative for congestion, sinus pain and sore throat.    Respiratory: Negative for cough, shortness of breath and wheezing.    Cardiovascular: Positive for leg swelling. Negative for chest pain and palpitations.   Gastrointestinal: Negative for abdominal pain, constipation, diarrhea, nausea and vomiting.   Genitourinary: Negative for dysuria, frequency, hematuria and urgency.   Musculoskeletal: Positive for joint pain (right knee, severe).   Skin: Negative for itching and rash.   Neurological: Positive for tingling (BLE numbness at baseline, more pronounced in his feet). Negative for dizziness, sensory change, speech change, focal weakness, weakness and headaches.   Endo/Heme/Allergies: Does not bruise/bleed easily.   Psychiatric/Behavioral: Negative for depression. The patient is not nervous/anxious and does not have insomnia.    All other systems reviewed and are negative.     Physical Exam  Temp:  [36.1 °C (97 °F)-37.1 °C (98.8 °F)] 36.4 °C (97.5 °F)  Pulse:  [] 115  Resp:  [11-23] 18  BP: ()/(59-86) 108/60  SpO2:  [94 %-100 %] 98 %    Physical Exam   Constitutional: He is oriented to person, place, and time. He appears well-developed and well-nourished. He is active and cooperative. He appears ill (chronically ill-appearing). No distress. Nasal cannula in place.   HENT:   Head: Normocephalic and atraumatic.   Eyes: Pupils are equal, round, and reactive to light. Conjunctivae and EOM are normal. Right eye exhibits no discharge. Left eye exhibits no discharge. No scleral icterus.   Neck: Normal range of motion. Neck supple.   Cardiovascular: Normal rate, regular rhythm, normal heart sounds and intact distal pulses.  Exam reveals no gallop and no friction rub.    No murmur heard.  Pulmonary/Chest: Effort  normal. No accessory muscle usage or stridor. No respiratory distress. He has decreased breath sounds in the right lower field and the left lower field. He has no wheezes. He has no rhonchi. He has no rales.   Abdominal: Soft. Bowel sounds are normal. He exhibits no distension and no abdominal bruit. There is no tenderness. There is no rigidity and no guarding.   Musculoskeletal: Normal range of motion. He exhibits edema (RLE).   Neurological: He is alert and oriented to person, place, and time. GCS eye subscore is 4. GCS verbal subscore is 5. GCS motor subscore is 6.   Skin: Skin is warm and dry. He is not diaphoretic. No erythema. No pallor.        Psychiatric: He has a normal mood and affect. His speech is normal and behavior is normal. Judgment and thought content normal. Cognition and memory are normal.   Nursing note and vitals reviewed.    Fluids    Intake/Output Summary (Last 24 hours) at 06/07/19 1624  Last data filed at 06/07/19 1430   Gross per 24 hour   Intake              720 ml   Output             1255 ml   Net             -535 ml     Laboratory  Recent Labs      06/06/19   1431  06/07/19   0308   WBC  10.4  7.7   RBC  3.72*  3.01*   HEMOGLOBIN  10.3*  8.3*   HEMATOCRIT  34.0*  27.8*   MCV  91.4  92.4   MCH  27.7  27.6   MCHC  30.3*  29.9*   RDW  51.8*  53.0*   PLATELETCT  426  294   MPV  9.1  9.1     Recent Labs      06/06/19   1431  06/07/19   0308   SODIUM  136  135   POTASSIUM  4.1  3.8   CHLORIDE  104  106   CO2  22  22   GLUCOSE  210*  149*   BUN  17  15   CREATININE  0.81  0.71   CALCIUM  9.1  8.1*     Imaging  US-EXTREMITY VENOUS LOWER UNILAT RIGHT   Final Result      CT-EXTREMITY, LOWER WITH RIGHT   Final Result      1.  A 15.9 cm abscess in the posterior upper calf as described.   2.  Associated moderate knee effusion with synovial hyperenhancement, consistent with septic arthritis.   3.  No cortical erosion/destruction to suggest osteomyelitis.   4.  No acute fracture or dislocation.         DX-FEMUR-2+ RIGHT   Final Result      Mild right hip osteoarthrosis. No acute fracture or dislocation.         Assessment/Plan  * Abscess of calf- (present on admission)   Assessment & Plan    S/p I&D today.   Continue IV vanco & ceftriaxone for now pending evaluation by infectious disease.   Intraop cultures pending.   Continue pain control.      Pyogenic arthritis of right knee joint (HCC)- (present on admission)   Assessment & Plan    Currently on IV vancomycin & ceftriaxone. ID consulted.   Intraop cultures pending.   Continue pain control.      DVT (deep venous thrombosis) (HCC)- (present on admission)   Assessment & Plan    Maintained on eliquis PTA. Will resume once cleared by surgery, usually at least 5 days from the day of surgery.   DVT prophylaxis can usually be resumed within 36 hours, per Giuliano doyle PA, but will wait on their clearance to start it postop.   SCD to the LLE.     Diabetes mellitus type 2 in nonobese ([facility])- (present on admission)   Assessment & Plan    -164 overnight. Maintained on metformin & glipizide at home PTA. Continue holding for now.   Continue accuchecks ACHS with SSI as required.   Continue diabetic diet.   Obtain updated A1C in a.m.  Hypoglycemia protocol in place if needed.     Chronic back pain- (present on admission)   Assessment & Plan    Maintained on scheduled MS Contin & prn flexeril, motrin, & percocet at home.   Continue MS Contin & prn percocet. Adjust as necessary.   Spoke with the bedside RN & patient about getting consistent pain intervention, including non-pharmacologic modalities.   Continue to monitor closely.   Ideally would like to see pain controlled tonight so the patient can participate in PT/OT tomorrow a.m.     Hypertension- (present on admission)   Assessment & Plan    SBP overnight 100s-teens.  Continue home lisinopril.  Trend SBP's per unit protocol.     Tobacco dependence- (present on admission)   Assessment & Plan    Continue to encourage the  importance of cessation.   Nicotine replacement protocol in place.     Tachycardia- (present on admission)   Assessment & Plan    Resolved then developed again postoperatively.   Will continue IV fluids for now but is more likely related to his pain. Discussed more consistent pain control with the bedside RN. Also encouraged nonpharmacologic techniques of pain management.     Normochromic normocytic anemia- (present on admission)   Assessment & Plan    Anemia workup with tomorrow's labs.   H/H stable for now.         VTE prophylaxis: SCD's. Currently holding Eliquis.  Per doyle Nobles, NOAC's usually held for 5 days postop & prophylactic AC usually held for 36 hours. Will wait for their ok to start/resume.

## 2019-06-07 NOTE — OR NURSING
Called Dr. Marinelli for PACU orders, no orders for pain meds, PT w/ c/o 10/10 pain, denies nausea.  Will have Dr. Marinelli paged for orders.

## 2019-06-07 NOTE — PROGRESS NOTES
"Pharmacy Kinetics 53 y.o. male on vancomycin day # 2   2019    Currently on Vancomycin 1,800mg iv q12hr (0600, 1800)    Indication for Treatment: SSTI w/abscess     Pertinent history per medical record: Admitted on 2019 for R Calf pain and swelling following recent hairline fracture with DVT at Rehab. CT scan  revealed 15.9 cm abscess in the posterior upper calf as described. Empiric antibiotics initiated and ID consulted.     Other antibiotics: Ceftriaxone 2g IV q24h     Allergies: Patient has no known allergies.     List concerns for renal function: Diabetes (metformin on hold)     Pertinent cultures to date:   : Blood cultures = NGTD   : R knee synovial fluid = IN process   : R leg wound culture = IN process     MRSA nares swab if pneumonia is a concern (ordered/positive/negative/n-a): n/a     Recent Labs      19   1431  19   0308   WBC  10.4  7.7   NEUTSPOLYS  72.80*   --      Recent Labs      19   1431  19   0308   BUN  17  15   CREATININE  0.81  0.71   ALBUMIN  3.6   --      No results for input(s): VANCOTROUGH, VANCOPEAK, VANCORANDOM in the last 72 hours.  Intake/Output Summary (Last 24 hours) at 19 1350  Last data filed at 19 1245   Gross per 24 hour   Intake              720 ml   Output              855 ml   Net             -135 ml      /67   Pulse 79   Temp 37 °C (98.6 °F) (Temporal)   Resp 16   Ht 1.88 m (6' 2.02\")   Wt 104.2 kg (229 lb 11.5 oz)   SpO2 98%  Temp (24hrs), Av.6 °C (97.8 °F), Min:36.1 °C (97 °F), Max:37.1 °C (98.8 °F)      A/P   1. Vancomycin dose change: No.   2. Next vancomycin level: Tomorrow,  at 0530   3. Goal trough: 12 - 16 mcg/mL   4. Comments: I&D of abscess today. Renal indices are stable following vancomycin initiation. Plan for steady state trough level tomorrow morning. Patient is moderate risk for accumulation - anticipate a normal to high volume of distribution. ID consulting - continue current " coverage. Cultures in process.     Netta Faulkner, PharmD

## 2019-06-07 NOTE — ASSESSMENT & PLAN NOTE
Was on eliquis outpt.   Waiting on official surgical clearance from surgery on starting of AC.   I discussed with orthopedics PA [Rudy], can start A/C Thur 3/13    Continue postop SCD to LLE.  Encouraging mobilization.

## 2019-06-07 NOTE — OR SURGEON
Immediate Post OP Note    PreOp Diagnosis: Right knee effusion, right leg abscess    PostOp Diagnosis: same    Procedure(s):  IRRIGATION AND DEBRIDEMENT, WOUND - LEG    Surgeon(s):  Paco Harris M.D.    Anesthesiologist/Type of Anesthesia:  Anesthesiologist: Wei Marinelli M.D./* No anesthesia type entered *    Surgical Staff:  Circulator: Jennifer Manzanares R.N.  Scrub Person: Marina Hitchcock    Specimens removed if any:  ID Type Source Tests Collected by Time Destination   1 : Right Knee Fluid Synovial Synovial Fluid AEROBIC/ANAEROBIC CULTURE (SURGERY) Pcao Harris M.D. 6/7/2019 11:15 AM    2 :  Other Other MISCELLANEOUS TEST, FLUID CELL COUNT, AEROBIC/ANAEROBIC CULTURE (SURGERY) Paoc Harris M.D. 6/7/2019 11:20 AM        Estimated Blood Loss: 100cc    Findings: see dictation    Complications: none known    PLAN:  --readmit medicine postop  --fu synovial fluid analysis from knee  --fu abscess cultures from leg  --WBAT RLE  --continue hemovac drain        6/7/2019 11:34 AM Paco Harris M.D.

## 2019-06-07 NOTE — ANESTHESIA PREPROCEDURE EVALUATION
Relevant Problems   (+) Diabetes mellitus type 2 in nonobese (HCC)       Physical Exam    Airway   Mallampati: II  TM distance: >3 FB  Neck ROM: full       Cardiovascular - normal exam  Rhythm: regular  Rate: normal  (-) murmur     Dental - normal exam         Pulmonary - normal exam  Breath sounds clear to auscultation     Abdominal    Neurological - normal exam                 Anesthesia Plan    ASA 2       Plan - general       Airway plan will be LMA        Induction: intravenous    Postoperative Plan: Postoperative administration of opioids is intended.    Pertinent diagnostic labs and testing reviewed    Informed Consent:    Anesthetic plan and risks discussed with patient.    Use of blood products discussed with: patient whom consented to blood products.

## 2019-06-07 NOTE — ASSESSMENT & PLAN NOTE
Intraop cultures + MSSA. Continuing IV ancef while inpatient. Anticipate transitioning to PO augmentin at discharge, end date tentatively 6/21 but may be extended depending on clinical course).  Continue pain control.

## 2019-06-07 NOTE — OP REPORT
DATE OF SERVICE:  06/07/2019    PREOPERATIVE DIAGNOSES:  1.  Right leg abscess.  2.  Right knee effusion.    POSTOPERATIVE DIAGNOSES:  1.  Right leg abscess.  2.  Right knee effusion.    PROCEDURE PERFORMED:  1.  Right knee arthrocentesis.  2.  Right leg abscess incision and drainage.    SURGEON:  Paco Harris MD    ANESTHESIOLOGIST:  Wei Marinelli MD    ANESTHESIA:  General.    ESTIMATED BLOOD LOSS:  100 mL    SPECIMENS:  1.  Synovial fluid from the right knee was sent to the lab for aerobic,   anaerobic cultures as well as crystals and cell count.  2.  Abscess fluid from the right leg abscess was sent to the lab for aerobic   and anaerobic cultures.    DRAIN:  An 8-inch Hemovac drain was placed to the abscess cavity in the right   leg.    INDICATIONS FOR PROCEDURE:  The patient is a 53-year-old male.  He has a   history of diabetes and a DVT of the right lower extremity.  He has been on   Eliquis for about 2 months.  He had increased pain, swelling, and redness in   the right leg.  He had a CT concerning for a large abscess within the calf.    He also had some evidence of a knee effusion, but clinically concern for   septic arthritis is low.  He did have a history of a knee injury a couple of   months ago as well.  He has been treated nonoperatively in a locked hinged   knee brace.  The plan was to go to the operating room for arthrocentesis of   his right knee just to rule out potential for associated infection and   incision and drainage of his right leg abscess.  He signed informed consent   preoperatively and wished to proceed as outlined above.    DESCRIPTION OF PROCEDURE:  The patient was met in the preoperative holding   area and the surgical site was signed and his consent was confirmed for   accuracy.  He was taken back to the operating room where general anesthesia   was induced.  A tourniquet was applied to the right thigh; however, it was not   inflated.  The right lower extremity was prepped  and draped in the usual   sterile fashion.  A formal timeout was performed confirming the patient's   correct name, correct surgical site, correct procedure, and correct   laterality.  Using an 18-gauge needle and 12 mL syringe and aspirated 12 mL of   clear blood-tinged fluid from the right knee joint and sent it to the lab for   cell count, crystals, and culture.  Clinically, it did not appear grossly   infected.  I then made a 6 cm incision of the proximal medial aspect of the   leg just posterior to the medial border of the tibia with a scalpel down   through skin.  Dissection was carried with Bovie cautery down through   subcutaneous tissue through the gastrocnemius fascia and a copious amount of   purulence was expressed from the intramuscular plane between the superficial   posterior and deep posterior compartments.  A curette was used to debride the   abscess cavity and rongeur as well.  The infection did track anteriorly over   the pes anserine bursal area.  Thorough lavage of the abscess cavity was   performed with Pulsavac using normal saline.  I then placed an 8-inch Hemovac   drain within the abscess cavity extending to the distal leg.  I then   reapproximated the skin edges loosely with 3-0 nylon and applied a compressive   absorbent dressing.  He was then awoken from anesthesia and transferred on   the [facility] and taken to the postanesthesia care unit in stable condition.    PLAN:  1.  The patient will be readmitted to the medical service postop.  2.  I recommend following up synovial fluid analysis, although my suspicion   for septic arthritis is low.  3.  Recommend following up intraoperative cultures of his abscess cavity and   directing antibiotic therapy accordingly.  4.  Recommend continuing a Hemovac drain for now.  5.  He can be weightbearing as tolerated in the right lower extremity.       ____________________________________     MD YARON Hall / HERB    DD:  06/07/2019  11:40:21  DT:  06/07/2019 11:55:45    D#:  [id number]  Job#:  [id number]

## 2019-06-07 NOTE — CONSULTS
DATE OF SERVICE:  06/06/2019    ORTHOPEDIC CONSULTATION    REQUESTING PHYSICIAN:  Dr. Glenn Gr, emergency department.    REASON FOR CONSULTATION:  Right leg concern for infection.    CHIEF COMPLAINT:  Right leg pain and swelling.    HISTORY OF PRESENT ILLNESS:  The patient is a 53-year-old male who has a   history of a DVT to the right lower extremity.  This has reportedly   complicated a complication related to an avulsion fracture of the distal femur   and a hamstring tear per the patient.  He states that he was initially   treated by Dr. Oglesby, in a hinged knee brace locked at 30 degrees.  He   developed a DVT and has been on Eliquis for 2 months.  He states that over the   last 5 or 6 hours, he developed increased pain and redness in the right leg.    He presented to the emergency department.  Ultrasound showed a fluid   collection at the calf and I was consulted due to some concern for potentially   infected hematoma.  The patient did take his Eliquis this morning.  He denies   other injuries or pain elsewhere.  He is able to dorsi and plantarflex his   foot.  He does state he has neuropathy bilaterally due to diabetes and   excessive alcohol intake when he was in his 20s.    ALLERGIES:  No known drug allergies.    OUTPATIENT MEDICATIONS:  Include Percocet, metformin, gabapentin, lisinopril,   Eliquis, Flexeril, glipizide SR, ibuprofen, and MS Contin.    PAST MEDICAL DIAGNOSES:  Include DVT, chronic pain, diabetes with neuropathy.    PAST SURGICAL HISTORY:  Denies any previous surgery.    SOCIAL HISTORY:  The patient smokes a pack of cigarettes a day.  He smokes   marijuana.  Denies alcohol use.    REVIEW OF SYSTEMS:  He denies obvious fevers, chills, nausea, vomiting,   shortness of breath, chest pain, otherwise normal per AMA criteria other than   already stated in the HPI.    FAMILY HISTORY:  Negative for significant medical problems that he is aware   of.    PHYSICAL EXAMINATION:  VITAL SIGNS:   Temperature 98, heart rate 115, respiratory rate 16, blood   pressure is 118/82, pulse oximetry 98% on room air.  GENERAL APPEARANCE:  The patient is alert.  He is oriented.  He is pleasant   and cooperative, and in no acute distress.  HEAD, EYES, EARS, NOSE, AND THROAT:  Normocephalic and atraumatic.  Mucous   membranes are moist.  PULMONARY:  Symmetric, unlabored breathing.  CARDIOVASCULAR:  Extremities are well perfused.  There is no obvious JVP is   noted.  ABDOMEN:  Not obviously distended.  MUSCULOSKELETAL:  Right lower extremity, he does have some mild swelling in   the knee.  He has some moderate swelling in the leg with a central area of   erythema.  There is some fluctuance posteromedially and anteromedially where   he is tender to palpation with some adjacent induration.  He is able to dorsi   and plantarflex his foot, and flex and extend his toes.  He has palpable   dorsalis pedis pulse.  He has diminished sensation to light touch in the foot.    DIAGNOSTIC IMAGING:  X-rays, 2 views of the right femur shows some chronic   calcification of the medial collateral ligament.  Duplex ultrasound of the   right lower extremity shows a large fluid collection, nonvascularized,   heterogenous at the calf, but no evidence of obvious DVT according to the   report.    LABORATORY DATA:  White blood cell count is 10.4.  Lactic acid 1.6.    ASSESSMENT:  A 53-year-old male with a history of deep venous thrombosis of   the right lower extremity who has been on Eliquis for 2 months with increased   redness, pain, and swelling in the leg consistent with an acute hematoma   versus infected hematoma.  He is being evaluated for admission to the   hospitalist service.    RECOMMENDATIONS:  1.  I discussed these findings with the patient and I have recommended   obtaining a CT scan with contrast of the right lower extremity to evaluate   further the location and the characteristics of the fluid collection to   determine next  appropriate treatment recommendations.  2.  We did discuss that if there is continued concern for infection that he   may require incision and drainage of this fluid collection.  He expressed   understanding and is fine proceeding with that if necessary.  3.  I recommend the patient be made n.p.o. after midnight and I will review   further imaging studies and leave further recommendations as indicated.  4.  He can be started on empiric antibiotics in the meantime from my   perspective.  5.  I recommend holding his Eliquis for now.       ____________________________________     MD YARON Hall / HERB    DD:  06/06/2019 16:49:50  DT:  06/06/2019 17:22:53    D#:  [id number]  Job#:  [id number]

## 2019-06-07 NOTE — ANESTHESIA TIME REPORT
Anesthesia Start and Stop Event Times     Date Time Event    6/7/2019 1056 Anesthesia Start     1140 Anesthesia Stop        Responsible Staff  06/07/19    Name Role Begin End    Wei Marinelli M.D. Anesth 1110 1140        Preop Diagnosis (Free Text):  Pre-op Diagnosis     Right leg Abscess        Preop Diagnosis (Codes):  Diagnosis Information     Diagnosis Code(s):         Post op Diagnosis  Abscess of right leg      Premium Reason  Non-Premium    Comments:

## 2019-06-07 NOTE — OR NURSING
PT from OR to PACU w/ RN/ANES.  PT reporting pain 10/10, burning and stabbing pain.  Dr. Marinelli notified needing PACU orders, PT medicated w/ Fentanyl 100 mcg, Oxycodone 10mg PO and PT w/ visible, notable shivering and given Demerol 12.5 mg for same.  PT reports pain now 6/10and tolerable, denies nausea, tolerating water.  Report called to [facility] and transport arranged.  PT resting comfortable at present time.  VSS.

## 2019-06-07 NOTE — PROGRESS NOTES
"Pharmacy Kinetics 53 y.o. male on vancomycin day # 1 2019    Currently on Vancomycin 2600 mg iv loading dose followed by Vancomycin 1800 mg q12h    Indication for Treatment: Skin infection    Pertinent history per medical record: Admitted on 2019 for calf abscess.  Patient presents with increased pain and erythema in his lower calf.  Surgery has been consulted for possible I & D in the AM.  Empiric antibiotics started.      Other antibiotics: Ceftriaxone 2 g q24h    Allergies: Patient has no known allergies.     List concerns for renal function: BUN/Scr > 20:1    Pertinent cultures to date:     Blood culture x2 -- in process    MRSA nares swab if pneumonia is a concern (ordered/positive/negative/n-a): n/a    Recent Labs      19   1431   WBC  10.4   NEUTSPOLYS  72.80*     Recent Labs      19   1431   BUN  17   CREATININE  0.81   ALBUMIN  3.6     No results for input(s): VANCOTROUGH, VANCOPEAK, VANCORANDOM in the last 72 hours.  Intake/Output Summary (Last 24 hours) at 19 0157  Last data filed at 19 1900   Gross per 24 hour   Intake                0 ml   Output              300 ml   Net             -300 ml      BP (!) 95/59   Pulse 85   Temp 37.1 °C (98.8 °F) (Temporal)   Resp 17   Ht 1.88 m (6' 2\")   Wt 104.2 kg (229 lb 11.5 oz)   SpO2 94%  Temp (24hrs), Av.9 °C (98.4 °F), Min:36.7 °C (98 °F), Max:37.1 °C (98.8 °F)      A/P   1. Vancomycin dose change: New start  2. Next vancomycin level: Prior to 4th dose (not ordered)  3. Goal trough: 12-16 mcg/mL  4. Comments: Patient has little risk for accumulation.  No prior history to guide dosing.  Will start a maintenance dose, per protocol, and recommend getting a level at steady state.  Pharmacy will continue to follow.      Loco Benitez, PharmD      "

## 2019-06-07 NOTE — CARE PLAN
Problem: Safety  Goal: Will remain free from falls  Outcome: PROGRESSING AS EXPECTED  Pt. Calls appropriately, bed in lowest position, locked, treaded socks in place, bedside table within reach, environment assessed for fall risk, educated about fall risk    Problem: Infection  Goal: Will remain free from infection  Outcome: PROGRESSING AS EXPECTED  Pt. Is afebrile, educated about handwashing, educated about signs of infection

## 2019-06-07 NOTE — H&P
Hospital Medicine History & Physical Note    Date of Service  6/6/2019    Primary Care Physician  Mohsen Tamasaby, M.D.    Consultants  Orthopedics Dr. Harris    Code Status  Full code    Chief Complaint  Right calf pain and swelling    History of Presenting Illness  53 y.o. male who presented 6/6/2019 with right calf pain and swelling.  The patient states that he was recently at rehab when he had a muscle tear.  Apparently there he was getting some sort of rehab that he was not supposed to be getting an dose suffered a hairline fracture of his femur.  Afterwards he was told he was doing well until today early this morning he says he suddenly developed pain and swelling in the calf it has since doubled in size and at this point is red painful and tender with diminished pulses of the right leg.  Stat CT of the lower extremity was ordered which at this point shows an abscess in the calf.  Patient will be kept n.p.o. for surgical intervention.  The patient will be on antibiotics using vancomycin and Rocephin.  The patient also has extension into the knee joint.  At this point patient will need surgical debridement and antibiotics for a while.  Continue with fluid resuscitation for the time being.    Review of Systems  Review of Systems   Constitutional: Negative.  Negative for chills, diaphoresis and fever.   HENT: Negative.    Eyes: Negative.  Negative for double vision.   Respiratory: Negative.  Negative for cough, hemoptysis and wheezing.    Cardiovascular: Negative.  Negative for chest pain, palpitations and leg swelling.   Gastrointestinal: Negative.  Negative for abdominal pain, blood in stool, constipation, diarrhea, heartburn, nausea and vomiting.   Genitourinary: Negative.  Negative for frequency, hematuria and urgency.   Musculoskeletal: Positive for joint pain (Right knee) and myalgias (Right calf).   Skin: Positive for rash (Right calf). Negative for itching.   Neurological: Negative.  Negative for  dizziness, focal weakness, seizures, loss of consciousness and headaches.   Endo/Heme/Allergies: Negative.  Does not bruise/bleed easily.   Psychiatric/Behavioral: Negative.  Negative for suicidal ideas. The patient is not nervous/anxious.    All other systems reviewed and are negative.      Past Medical History  Diabetes mellitus type 2.  Hypertension.  Tobacco abuse disorder.      Surgical History  Denies any surgical history in the past.    Family History  States that he did not know his father.  Mother had diabetes and he has extensive history of stroke and diabetes in his family.    Social History   reports that he has been smoking Cigarettes.  He has been smoking about 1.00 pack per day. He has quit using smokeless tobacco. He reports that he uses drugs, including Inhaled. He reports that he does not drink alcohol.    Allergies  No Known Allergies    Medications  Prior to Admission Medications   Prescriptions Last Dose Informant Patient Reported? Taking?   apixaban (ELIQUIS) 5mg Tab 6/6/2019 at 0900 Patient Yes No   Sig: Take 5 mg by mouth 2 Times a Day.   cyclobenzaprine (FLEXERIL) 10 MG Tab 6/4/2019 at HS Patient Yes No   Sig: Take 10 mg by mouth 3 times a day as needed.   gabapentin (NEURONTIN) 300 MG Cap 6/6/2019 at 0900 Patient Yes No   Sig: Take 600 mg by mouth 3 times a day.   glipiZIDE SR (GLUCOTROL) 5 MG TABLET SR 24 HR 6/6/2019 at AM Patient Yes No   Sig: Take 5 mg by mouth every day.   ibuprofen (MOTRIN) 600 MG Tab 6/6/2019 at 0900 Patient Yes No   Sig: Take 600 mg by mouth every 8 hours as needed.   lisinopril (PRINIVIL) 20 MG Tab 6/6/2019 at 0900 Patient Yes No   Sig: Take 20 mg by mouth every day.   metformin (GLUCOPHAGE) 1000 MG tablet 6/6/2019 at 0900 Patient Yes No   Sig: Take 1,000 mg by mouth 2 times a day, with meals.   morphine ER (MS CONTIN) 15 MG Tab CR tablet 1 WEEK at OUT Patient Yes No   Sig: Take 15 mg by mouth every 12 hours.   oxyCODONE-acetaminophen (PERCOCET-10)  MG Tab 1  WEEK at PRN Patient Yes Yes   Sig: Take 1 Tab by mouth every four hours as needed for Severe Pain.      Facility-Administered Medications: None       Physical Exam  Temp:  [36.7 °C (98 °F)-36.8 °C (98.3 °F)] 36.7 °C (98 °F)  Pulse:  [] 105  Resp:  [16-18] 16  BP: (118-136)/(82-86) 120/86  SpO2:  [96 %-100 %] 100 %    Physical Exam   Constitutional: He is oriented to person, place, and time. He appears well-developed and well-nourished.   HENT:   Head: Normocephalic and atraumatic.   Right Ear: External ear normal.   Left Ear: External ear normal.   Nose: Nose normal.   Mouth/Throat: Oropharynx is clear and moist.   Eyes: Pupils are equal, round, and reactive to light. Conjunctivae and EOM are normal.   Neck: Normal range of motion. Neck supple. No JVD present. No thyromegaly present.   Cardiovascular: Normal rate and regular rhythm.    No murmur heard.  Pulmonary/Chest: He has no wheezes. He has no rales. He exhibits no tenderness.   Abdominal: He exhibits no distension and no mass. There is no tenderness. There is no rebound and no guarding.   Musculoskeletal: Normal range of motion. He exhibits edema (Right calf) and tenderness.   Lymphadenopathy:     He has no cervical adenopathy.   Neurological: He is alert and oriented to person, place, and time. He has normal reflexes. No cranial nerve deficit.   Skin: Skin is warm and dry. No rash noted. There is erythema (Right calf).   Psychiatric: He has a normal mood and affect.   Nursing note and vitals reviewed.      Laboratory:  Recent Labs      06/06/19   1431   WBC  10.4   RBC  3.72*   HEMOGLOBIN  10.3*   HEMATOCRIT  34.0*   MCV  91.4   MCH  27.7   MCHC  30.3*   RDW  51.8*   PLATELETCT  426   MPV  9.1     Recent Labs      06/06/19   1431   SODIUM  136   POTASSIUM  4.1   CHLORIDE  104   CO2  22   GLUCOSE  210*   BUN  17   CREATININE  0.81   CALCIUM  9.1     Recent Labs      06/06/19   1431   ALTSGPT  8   ASTSGOT  9*   ALKPHOSPHAT  75   TBILIRUBIN  0.3   GLUCOSE   210*                 No results for input(s): TROPONINI in the last 72 hours.    Urinalysis:    No results found     Imaging:  US-EXTREMITY VENOUS LOWER UNILAT RIGHT   Final Result      CT-EXTREMITY, LOWER WITH RIGHT   Final Result      1.  A 15.9 cm abscess in the posterior upper calf as described.   2.  Associated moderate knee effusion with synovial hyperenhancement, consistent with septic arthritis.   3.  No cortical erosion/destruction to suggest osteomyelitis.   4.  No acute fracture or dislocation.      DX-FEMUR-2+ RIGHT   Final Result      Mild right hip osteoarthrosis. No acute fracture or dislocation.            Assessment/Plan:  I anticipate this patient will require at least two midnights for appropriate medical management, necessitating inpatient admission.    * Abscess of calf- (present on admission)   Assessment & Plan    I spoken with orthopedics regarding the patient's abscess.  They will be evacuating this abscess in his possible.  Patient will be kept n.p.o. for now.  Patient will be initiated antibiotics using vancomycin and Rocephin.  Cultures have been obtained and we are pending culture results.  Continue with pain management optimization in the meantime.     Pyogenic arthritis of right knee joint (HCC)- (present on admission)   Assessment & Plan    The right knee joint may be the source of the patient's calf abscess.  The patient did have a recent closed fracture of the distal part of the femur which may have been responsible for the abscess itself.  Continue at this point with antibiotic management and pain management     Normochromic normocytic anemia   Assessment & Plan    Evaluate B12, folic acid, iron level.  Transfuse if below 7 or 21 on his H&H.     Tobacco abuse   Assessment & Plan    -nicotine replacement protocol and cessation education provided for 12 minutes, discussed options of nicotine patch, acupuncture, medical treatment with wellbutrin and chantix. Discussed other options.  Code 36259     Tachycardia   Assessment & Plan    Most likely secondary to the pain in the infection, give fluid resuscitation and monitor heart rate     Diabetes mellitus type 2 in nonobese (HCC)   Assessment & Plan    -accus with sliding scale coverage  -diabetic diet  -diabetic education  -follow glycohemoglobin levels long term, evaluate hemoglobin A1c  -continue with oral antihyperglycemics  -monitor for hypoglycemic episodes and adjust control if he should get low         VTE prophylaxis: Heparin

## 2019-06-07 NOTE — ANESTHESIA PROCEDURE NOTES
Airway  Date/Time: 6/7/2019 11:05 AM  Performed by: KEVIN SOSA  Authorized by: KEVIN SOSA     Location:  OR  Urgency:  Elective  Indications for Airway Management:  Anesthesia  Spontaneous Ventilation: absent    Sedation Level:  Deep  Preoxygenated: Yes    Patient Position:  Sniffing  Final Airway Type:  Supraglottic airway  SGA Size:  5  Number of Attempts at Approach:  1  Number of Other Approaches Attempted:  0

## 2019-06-07 NOTE — CONSULTS
ID consult requested by Dr. De Santiago for RLE abscess  Chart reviewed and not seen as pt in OR  54 yo man with recent right femur hairline fracture with hamstring tear, DVT on eliquis recently at rehab  Admitted for acute onset of increased RLE swelling, pain, and erythema  Found to have 15.9 cm abscess on CT scan  Plan for I&D today  Continue vanco and CTX  Full consult to follow

## 2019-06-07 NOTE — ASSESSMENT & PLAN NOTE
Holding home glipizide & metformin for now.  Blood sugars have been reasonably controlled  Continue accuchecks ACHS with SSI as required.   Continue diabetic diet.   Hypoglycemia protocol

## 2019-06-07 NOTE — ANESTHESIA QCDR
2019 [facility] Clinical Data Registry (for Quality Improvement)     Postoperative nausea/vomiting risk protocol (Adult = 18 yrs and Pediatric 3-17 yrs)- (430 and 463)  General inhalation anesthetic (NOT TIVA) with PONV risk factors: No  Provision of anti-emetic therapy with at least 2 different classes of agents: N/A  Patient DID NOT receive anti-emetic therapy and reason is documented in Medical Record: N/A    Multimodal Pain Management- (AQI59)  Patient undergoing Elective Surgery (i.e. Outpatient, or ASC, or Prescheduled Surgery prior to Hospital Admission): No  Use of Multimodal Pain Management, two or more drugs and/or interventions, NOT including systemic opioids: N/A  Exception: Documented allergy to multiple classes of analgesics: N/A    PACU assessment of acute postoperative pain prior to Anesthesia Care End- Applies to Patients Age = 18- (ABG7)  Initial PACU pain score is which of the following: < 7/10  Patient unable to report pain score: N/A    Post-anesthetic transfer of care checklist/protocol to PACU/ICU- (426 and 427)  Upon conclusion of case, patient transferred to which of the following locations: PACU/Non-ICU  Use of transfer checklist/protocol: Yes  Exclusion: Service Performed in Patient Hospital Room (and thus did not require transfer): N/A    PACU Reintubation- (AQI31)  General anesthesia requiring endotracheal intubation (ETT) along with subsequent extubation in OR or PACU: No  Required reintubation in the PACU: N/A  Extubation was a planned trial documented in the medical record prior to removal of the original airway device: N/A    Unplanned admission to ICU related to anesthesia service up through end of PACU care- (MD51)  Unplanned admission to ICU (not initially anticipated at anesthesia start time): No

## 2019-06-07 NOTE — CARE PLAN
Problem: Knowledge Deficit  Goal: Knowledge of disease process/condition, treatment plan, diagnostic tests, and medications will improve    Intervention: Assess knowledge level of disease process/condition, treatment plan, diagnostic tests, and medications  Surgery 06/07 at 1015.

## 2019-06-07 NOTE — ANESTHESIA POSTPROCEDURE EVALUATION
Patient: Rudy Pérez    Procedure Summary     Date:  06/07/19 Room / Location:  [place] [address] / SURGERY [facility]    Anesthesia Start:  1056 Anesthesia Stop:  1140    Procedure:  IRRIGATION AND DEBRIDEMENT, WOUND - LEG (Right ) Diagnosis:  (Right leg Abscess)    Surgeon:  Paco Harris M.D. Responsible Provider:  Wei Marinelli M.D.    Anesthesia Type:  general ASA Status:  2          Final Anesthesia Type: general  Last vitals  BP   Blood Pressure: 106/65, NIBP: (!) 84/52    Temp   36.2 °C (97.1 °F)    Pulse   Pulse: 90   Resp   18    SpO2   99 %      Anesthesia Post Evaluation    Patient location during evaluation: PACU  Patient participation: complete - patient participated  Level of consciousness: awake and alert  Pain score: 3    Airway patency: patent  Anesthetic complications: no  Cardiovascular status: hemodynamically stable  Respiratory status: acceptable  Hydration status: euvolemic    PONV: none           Nurse Pain Score: 6 (NPRS)

## 2019-06-07 NOTE — ASSESSMENT & PLAN NOTE
Maintained on scheduled MS Contin & prn flexeril, motrin (monitor kidney function closely), & percocet at home.   Continue MS Contin & prn percocet. Adjust as necessary.   Nursing to also utilize non-pharmacologic modalities.

## 2019-06-07 NOTE — DIETARY
Nutrition Services: Day 1 of admit.  Rudy Pérez is a 53 y.o. male with admitting DX of Calf abscess  Consult received for Wt Loss on Nutrition Admit Screen     Pt states prior to admit he was eating okay. Pt states he didn't like the food at the rehab place he was at. Pt states his appetite is coming back now. Pt reports 11 lb wt loss in 1 week. Pt states overall he has lost wt in the last 6 month and his UBW was 280 lbs (127.3 kg). Pt does state his wt has been more stable recently. Discussed snacks, added BID per pt preference. Pt had no questions regarding the diabetic diet. Pt states he has been diabetic for 25-30 years now.     Assessment:  Ht: 188 cm, Wt 6/6: 104.2 kg via stand up scale, BMI: 29.8 - Overweight  Diet/Intake: Diabetic - Per chart pt PO % 1 meal     Evaluation:   1. Pt noted with severe fat loss as evidenced by pronounced depression in temporal region   2. Wt loss percent is 18.1% in 6 months which is severe.   3. Labs: Glucose 149, POC glucose 133, 126, 164  4. Meds: rocephin, neurontin, humulin R, ms contin, pericolace, vancomycin, zofran (prn)  5. Fluids: NS infusion @ 100 mL/hr    Malnutrition Risk: Pt with severe malnutrition related to unknown etiology as evidenced by severe wt loss of 18.1% in 6 months and severe fat loss as evidenced by pronounced depression in temporal region     Recommendations/Plan:  1. Encourage intake of meals  2. Document intake of all meals as % taken in ADL's to provide interdisciplinary communication across all shifts.   3. Monitor weight.  4. Nutrition rep will continue to see patient for ongoing meal and snack preferences.  5. Obtain supplement order per RD as needed.    RD following

## 2019-06-07 NOTE — CARE PLAN
Problem: Pain Management  Goal: Pain level will decrease to patient's comfort goal    Intervention: Follow pain managment plan developed in collaboration with patient and Interdisciplinary Team  Pain management appropriate at this time, PRN medication administered as ordered with relief to patients baseline pain level.

## 2019-06-08 LAB
25(OH)D3 SERPL-MCNC: 11 NG/ML (ref 30–100)
ANION GAP SERPL CALC-SCNC: 7 MMOL/L (ref 0–11.9)
BASOPHILS # BLD AUTO: 0.3 % (ref 0–1.8)
BASOPHILS # BLD: 0.02 K/UL (ref 0–0.12)
BUN SERPL-MCNC: 14 MG/DL (ref 8–22)
CALCIUM SERPL-MCNC: 8.1 MG/DL (ref 8.5–10.5)
CHLORIDE SERPL-SCNC: 105 MMOL/L (ref 96–112)
CO2 SERPL-SCNC: 21 MMOL/L (ref 20–33)
CREAT SERPL-MCNC: 0.67 MG/DL (ref 0.5–1.4)
EOSINOPHIL # BLD AUTO: 0.49 K/UL (ref 0–0.51)
EOSINOPHIL NFR BLD: 7.5 % (ref 0–6.9)
ERYTHROCYTE [DISTWIDTH] IN BLOOD BY AUTOMATED COUNT: 52 FL (ref 35.9–50)
EST. AVERAGE GLUCOSE BLD GHB EST-MCNC: 154 MG/DL
FERRITIN SERPL-MCNC: 266.8 NG/ML (ref 22–322)
FOLATE SERPL-MCNC: 10 NG/ML
GLUCOSE BLD-MCNC: 120 MG/DL (ref 65–99)
GLUCOSE BLD-MCNC: 134 MG/DL (ref 65–99)
GLUCOSE BLD-MCNC: 159 MG/DL (ref 65–99)
GLUCOSE BLD-MCNC: 161 MG/DL (ref 65–99)
GLUCOSE BLD-MCNC: 177 MG/DL (ref 65–99)
GLUCOSE BLD-MCNC: 188 MG/DL (ref 65–99)
GLUCOSE SERPL-MCNC: 148 MG/DL (ref 65–99)
HBA1C MFR BLD: 7 % (ref 0–5.6)
HCT VFR BLD AUTO: 23.9 % (ref 42–52)
HGB BLD-MCNC: 7.4 G/DL (ref 14–18)
HGB RETIC QN AUTO: 30.1 PG/CELL (ref 29–35)
IMM GRANULOCYTES # BLD AUTO: 0.02 K/UL (ref 0–0.11)
IMM GRANULOCYTES NFR BLD AUTO: 0.3 % (ref 0–0.9)
IMM RETICS NFR: 7.7 % (ref 9.3–17.4)
IRON SATN MFR SERPL: 9 % (ref 15–55)
IRON SERPL-MCNC: 17 UG/DL (ref 50–180)
LYMPHOCYTES # BLD AUTO: 1.22 K/UL (ref 1–4.8)
LYMPHOCYTES NFR BLD: 18.6 % (ref 22–41)
MAGNESIUM SERPL-MCNC: 1.3 MG/DL (ref 1.5–2.5)
MCH RBC QN AUTO: 28 PG (ref 27–33)
MCHC RBC AUTO-ENTMCNC: 31 G/DL (ref 33.7–35.3)
MCV RBC AUTO: 90.5 FL (ref 81.4–97.8)
MONOCYTES # BLD AUTO: 0.4 K/UL (ref 0–0.85)
MONOCYTES NFR BLD AUTO: 6.1 % (ref 0–13.4)
NEUTROPHILS # BLD AUTO: 4.4 K/UL (ref 1.82–7.42)
NEUTROPHILS NFR BLD: 67.2 % (ref 44–72)
NRBC # BLD AUTO: 0 K/UL
NRBC BLD-RTO: 0 /100 WBC
PLATELET # BLD AUTO: 259 K/UL (ref 164–446)
PMV BLD AUTO: 9.2 FL (ref 9–12.9)
POTASSIUM SERPL-SCNC: 4.1 MMOL/L (ref 3.6–5.5)
RBC # BLD AUTO: 2.64 M/UL (ref 4.7–6.1)
RETICS # AUTO: 0.04 M/UL (ref 0.04–0.06)
RETICS/RBC NFR: 1.6 % (ref 0.8–2.1)
SODIUM SERPL-SCNC: 133 MMOL/L (ref 135–145)
TIBC SERPL-MCNC: 179 UG/DL (ref 250–450)
VANCOMYCIN TROUGH SERPL-MCNC: 25.1 UG/ML (ref 10–20)
VIT B12 SERPL-MCNC: 121 PG/ML (ref 211–911)
WBC # BLD AUTO: 6.6 K/UL (ref 4.8–10.8)

## 2019-06-08 PROCEDURE — 82728 ASSAY OF FERRITIN: CPT

## 2019-06-08 PROCEDURE — 700102 HCHG RX REV CODE 250 W/ 637 OVERRIDE(OP): Performed by: HOSPITALIST

## 2019-06-08 PROCEDURE — 85046 RETICYTE/HGB CONCENTRATE: CPT

## 2019-06-08 PROCEDURE — 80202 ASSAY OF VANCOMYCIN: CPT

## 2019-06-08 PROCEDURE — 99233 SBSQ HOSP IP/OBS HIGH 50: CPT | Performed by: INTERNAL MEDICINE

## 2019-06-08 PROCEDURE — 82746 ASSAY OF FOLIC ACID SERUM: CPT

## 2019-06-08 PROCEDURE — 36415 COLL VENOUS BLD VENIPUNCTURE: CPT

## 2019-06-08 PROCEDURE — 97161 PT EVAL LOW COMPLEX 20 MIN: CPT

## 2019-06-08 PROCEDURE — 82306 VITAMIN D 25 HYDROXY: CPT

## 2019-06-08 PROCEDURE — 83036 HEMOGLOBIN GLYCOSYLATED A1C: CPT

## 2019-06-08 PROCEDURE — 80048 BASIC METABOLIC PNL TOTAL CA: CPT

## 2019-06-08 PROCEDURE — 700105 HCHG RX REV CODE 258: Performed by: HOSPITALIST

## 2019-06-08 PROCEDURE — 82607 VITAMIN B-12: CPT

## 2019-06-08 PROCEDURE — 82962 GLUCOSE BLOOD TEST: CPT | Mod: 91

## 2019-06-08 PROCEDURE — 97165 OT EVAL LOW COMPLEX 30 MIN: CPT

## 2019-06-08 PROCEDURE — 99255 IP/OBS CONSLTJ NEW/EST HI 80: CPT | Performed by: INTERNAL MEDICINE

## 2019-06-08 PROCEDURE — 83735 ASSAY OF MAGNESIUM: CPT

## 2019-06-08 PROCEDURE — 83540 ASSAY OF IRON: CPT

## 2019-06-08 PROCEDURE — A9270 NON-COVERED ITEM OR SERVICE: HCPCS | Performed by: INTERNAL MEDICINE

## 2019-06-08 PROCEDURE — 700111 HCHG RX REV CODE 636 W/ 250 OVERRIDE (IP): Performed by: INTERNAL MEDICINE

## 2019-06-08 PROCEDURE — 700105 HCHG RX REV CODE 258: Performed by: INTERNAL MEDICINE

## 2019-06-08 PROCEDURE — 700102 HCHG RX REV CODE 250 W/ 637 OVERRIDE(OP): Performed by: INTERNAL MEDICINE

## 2019-06-08 PROCEDURE — 85025 COMPLETE CBC W/AUTO DIFF WBC: CPT

## 2019-06-08 PROCEDURE — 83550 IRON BINDING TEST: CPT

## 2019-06-08 PROCEDURE — A9270 NON-COVERED ITEM OR SERVICE: HCPCS | Performed by: HOSPITALIST

## 2019-06-08 PROCEDURE — 770006 HCHG ROOM/CARE - MED/SURG/GYN SEMI*

## 2019-06-08 PROCEDURE — 700111 HCHG RX REV CODE 636 W/ 250 OVERRIDE (IP): Performed by: HOSPITALIST

## 2019-06-08 RX ORDER — HYDROXYZINE HYDROCHLORIDE 10 MG/1
10 TABLET, FILM COATED ORAL 3 TIMES DAILY PRN
Status: DISCONTINUED | OUTPATIENT
Start: 2019-06-08 | End: 2019-06-13 | Stop reason: HOSPADM

## 2019-06-08 RX ORDER — MAGNESIUM SULFATE HEPTAHYDRATE 40 MG/ML
4 INJECTION, SOLUTION INTRAVENOUS ONCE
Status: COMPLETED | OUTPATIENT
Start: 2019-06-08 | End: 2019-06-08

## 2019-06-08 RX ADMIN — MAGNESIUM 64 MG (MAGNESIUM CHLORIDE) TABLET,DELAYED RELEASE 64 MG: at 08:58

## 2019-06-08 RX ADMIN — INSULIN HUMAN 2 UNITS: 100 INJECTION, SOLUTION PARENTERAL at 12:20

## 2019-06-08 RX ADMIN — MAGNESIUM SULFATE IN WATER 4 G: 40 INJECTION, SOLUTION INTRAVENOUS at 08:58

## 2019-06-08 RX ADMIN — CEFTRIAXONE 2 G: 2 INJECTION, POWDER, FOR SOLUTION INTRAMUSCULAR; INTRAVENOUS at 17:45

## 2019-06-08 RX ADMIN — VANCOMYCIN HYDROCHLORIDE 1100 MG: 100 INJECTION, POWDER, LYOPHILIZED, FOR SOLUTION INTRAVENOUS at 23:08

## 2019-06-08 RX ADMIN — OXYCODONE HYDROCHLORIDE AND ACETAMINOPHEN 1 TABLET: 10; 325 TABLET ORAL at 07:57

## 2019-06-08 RX ADMIN — OXYCODONE HYDROCHLORIDE AND ACETAMINOPHEN 1 TABLET: 10; 325 TABLET ORAL at 16:24

## 2019-06-08 RX ADMIN — VANCOMYCIN HYDROCHLORIDE 1800 MG: 100 INJECTION, POWDER, LYOPHILIZED, FOR SOLUTION INTRAVENOUS at 05:39

## 2019-06-08 RX ADMIN — INSULIN HUMAN 2 UNITS: 100 INJECTION, SOLUTION PARENTERAL at 23:13

## 2019-06-08 RX ADMIN — OXYCODONE HYDROCHLORIDE AND ACETAMINOPHEN 1 TABLET: 10; 325 TABLET ORAL at 12:15

## 2019-06-08 RX ADMIN — GABAPENTIN 600 MG: 300 CAPSULE ORAL at 12:15

## 2019-06-08 RX ADMIN — OXYCODONE HYDROCHLORIDE AND ACETAMINOPHEN 1 TABLET: 10; 325 TABLET ORAL at 20:40

## 2019-06-08 RX ADMIN — HYDROXYZINE HYDROCHLORIDE 10 MG: 10 TABLET, FILM COATED ORAL at 12:15

## 2019-06-08 RX ADMIN — LISINOPRIL 20 MG: 20 TABLET ORAL at 05:35

## 2019-06-08 RX ADMIN — OXYCODONE HYDROCHLORIDE AND ACETAMINOPHEN 1 TABLET: 10; 325 TABLET ORAL at 03:48

## 2019-06-08 RX ADMIN — INSULIN HUMAN 2 UNITS: 100 INJECTION, SOLUTION PARENTERAL at 18:19

## 2019-06-08 RX ADMIN — GABAPENTIN 600 MG: 300 CAPSULE ORAL at 17:46

## 2019-06-08 RX ADMIN — MAGNESIUM 64 MG (MAGNESIUM CHLORIDE) TABLET,DELAYED RELEASE 64 MG: at 17:49

## 2019-06-08 RX ADMIN — SENNOSIDES,DOCUSATE SODIUM 2 TABLET: 8.6; 5 TABLET, FILM COATED ORAL at 17:46

## 2019-06-08 RX ADMIN — MORPHINE SULFATE 15 MG: 15 TABLET, EXTENDED RELEASE ORAL at 05:35

## 2019-06-08 RX ADMIN — GABAPENTIN 600 MG: 300 CAPSULE ORAL at 05:35

## 2019-06-08 RX ADMIN — HYDROXYZINE HYDROCHLORIDE 10 MG: 10 TABLET, FILM COATED ORAL at 17:49

## 2019-06-08 RX ADMIN — NICOTINE 21 MG: 21 PATCH, EXTENDED RELEASE TRANSDERMAL at 05:35

## 2019-06-08 RX ADMIN — SODIUM CHLORIDE: 9 INJECTION, SOLUTION INTRAVENOUS at 03:17

## 2019-06-08 RX ADMIN — MORPHINE SULFATE 15 MG: 15 TABLET, EXTENDED RELEASE ORAL at 17:46

## 2019-06-08 ASSESSMENT — GAIT ASSESSMENTS
DISTANCE (FEET): 75
DEVIATION: ANTALGIC
GAIT LEVEL OF ASSIST: SUPERVISED
ASSISTIVE DEVICE: CRUTCHES

## 2019-06-08 ASSESSMENT — COGNITIVE AND FUNCTIONAL STATUS - GENERAL
HELP NEEDED FOR BATHING: A LITTLE
CLIMB 3 TO 5 STEPS WITH RAILING: A LITTLE
DAILY ACTIVITIY SCORE: 22
SUGGESTED CMS G CODE MODIFIER MOBILITY: CJ
MOVING FROM LYING ON BACK TO SITTING ON SIDE OF FLAT BED: A LITTLE
MOBILITY SCORE: 20
SUGGESTED CMS G CODE MODIFIER DAILY ACTIVITY: CJ
PERSONAL GROOMING: A LITTLE
WALKING IN HOSPITAL ROOM: A LITTLE
STANDING UP FROM CHAIR USING ARMS: A LITTLE

## 2019-06-08 ASSESSMENT — ENCOUNTER SYMPTOMS
CHILLS: 0
COUGH: 0
SHORTNESS OF BREATH: 0
SENSORY CHANGE: 1
WEAKNESS: 1
DEPRESSION: 0
ABDOMINAL PAIN: 0
SENSORY CHANGE: 0
DIZZINESS: 0
TINGLING: 1
DIARRHEA: 0
FEVER: 0
VOMITING: 0
BRUISES/BLEEDS EASILY: 0
HEADACHES: 0
NAUSEA: 0

## 2019-06-08 ASSESSMENT — ACTIVITIES OF DAILY LIVING (ADL): TOILETING: INDEPENDENT

## 2019-06-08 NOTE — THERAPY
"Occupational Therapy Evaluation completed.   Functional Status:  Supervised for safety with functional transfers and standing activity with RLE pain and limited tolerance to weight bearing , managing crutches.   Plan of Care: Will benefit from Occupational Therapy 1-2 additional visits   Discharge Recommendations:  Equipment: Shower Chair. Post-acute therapy It appears pt would be safe to discharge directly  to home with in home support of girlfriend and possible home health for additional skilled therapy services.    See \"Rehab Therapy-Acute\" Patient Summary Report for complete documentation.    "

## 2019-06-08 NOTE — THERAPY
"Physical Therapy Evaluation completed.   Bed Mobility:  Supine to Sit: Supervised  Transfers: Sit to Stand: Supervised  Gait: Level Of Assist: Supervised with Will Continue to Assess for Equipment Needs. Did well with axillary crutches but could benefit from FWW and would need one ordered if so.       Plan of Care: Will benefit from Physical Therapy 5 times per week  Discharge Recommendations: Equipment: Crutches. Post-acute therapy Recommend outpatient or home health transitional care services for continued physical therapy services.    Pt presents to acute PT s/p R calf I/D and hemovac for calf abscess with subsequent debility, weakness, impaired functional mobility/endurance and limited gait independence/tolerance. Pt doing well today with PT but will require stairs as he lives on second floor, prior to D/C. Pt will benefit from skilled acute PT to address impairments and assist with d/c planning. Pt will likely be able to D/C home depending on progress in acute care.    See \"Rehab Therapy-Acute\" Patient Summary Report for complete documentation.     "

## 2019-06-08 NOTE — PROGRESS NOTES
"Pharmacy Kinetics 53 y.o. male on vancomycin day # 3   2019    Currently on Vancomycin 1,100mg iv q12hr (1100, 2300) - Dose reduction.     Indication for Treatment: SSTI w/abscess      Pertinent history per medical record: Admitted on 2019 for R Calf pain and swelling following recent hairline fracture with DVT at Rehab. CT scan  revealed 15.9 cm abscess in the posterior upper calf as described. Empiric antibiotics initiated and ID consulted.      Other antibiotics: Ceftriaxone 2g IV q24h      Allergies: Patient has no known allergies.      List concerns for renal function: Diabetes (metformin on hold), BMI 30      Pertinent cultures to date:   : Blood cultures = NGTD   : R knee synovial fluid = No growth at 24 hours   : R leg wound culture = Staph Aureus - awaiting sensitivities       MRSA nares swab if pneumonia is a concern (ordered/positive/negative/n-a): n/a     Recent Labs      19   1431  19   0308  19   0525   WBC  10.4  7.7  6.6   NEUTSPOLYS  72.80*   --   67.20     Recent Labs      19   1431  19   0308  19   0525   BUN  17  15  14   CREATININE  0.81  0.71  0.67   ALBUMIN  3.6   --    --      Recent Labs      19   0525   VANCOTROUGH  25.1*     Intake/Output Summary (Last 24 hours) at 19 1606  Last data filed at 19 1500   Gross per 24 hour   Intake                0 ml   Output             2045 ml   Net            -2045 ml      /72   Pulse 94   Temp 36.4 °C (97.6 °F) (Temporal)   Resp 16   Ht 1.88 m (6' 2.02\")   Wt 107.9 kg (237 lb 14 oz)   SpO2 92%  Temp (24hrs), Av.9 °C (98.5 °F), Min:36.4 °C (97.6 °F), Max:37.7 °C (99.9 °F)      A/P   1. Vancomycin dose change: Yes, today.   2. Next vancomycin level: 6/10/19, not yet ordered.   3. Goal trough: 12 - 16 mcg/mL   4. Comments: Vancomycin level resulted this morning at 25.1mcg/mL - true trough level above goal range as outlined above. Reduced dose from 1,800mg to 1,100mg IV " q12h and adjusted administration times based on linear kinetics calculation. Renal indices are stable. Repeat trough level in a couple days if therapy continues to monitor for accumulation, patient is moderate risk d/t size, anticipate a higher volume of distribution. I&D culture resulted with Staph Aureus - awaiting sensitivity report. ID consulting - continue current therapy pending culture results.     Netta Faulkner, KeyannaD

## 2019-06-08 NOTE — CONSULTS
INFECTIOUS DISEASES INPATIENT CONSULT NOTE     Date of Service: 6/8/2019    Consult Requested By: Jose De Santiago M.D.    Reason for Consultation: Right lower extremity abscess    History of Present Illness:   Rudy Pérez is a 53 y.o. man with a history of type 2 diabetes mellitus complicated by diabetic peripheral neuropathy, recent hamstring tear with avulsion fracture of the distal right femur complicated by a DVT on Eliquis and hypertension admitted 6/6/2019 for worsening right calf pain and swelling.  Patient states approximately 3 months ago he was moving but denies any associated trauma or injury.  He developed progressive right lower extremity pain exacerbated by standing and walking.  He was subsequently hospitalized at [facility] where he was found to have a muscle tear and an avulsion fracture of the distal right femur complicated by a DVT and was started on Eliquis.  Per the patient, there was also report of diabetic foot infections for which a PICC line was placed and he was transferred to [facility] for IV antibiotics for a plan 2-week course.  He does not know antibiotics he was being given.  While there he had been undergoing physical therapy.  He reportedly have been doing well without any new issues until earlier on the day of admission, he developed acute onset of right knee and leg pain associated with swelling and erythema.  He was noted to have diminished pulses of the right lower extremity.  He was unable to stand or ambulate.  He denies any associated fevers, or chills.  No cough, chest pain, shortness of breath.  No nausea, vomiting, abdominal pain or diarrhea.     On presentation, he was afebrile without any leukocytosis.  An ultrasound showed a fluid collection of the calf and a CT scan of the right lower extremity revealed a 15.9 cm abscess in the posterior upper calf in addition to a moderate knee effusion concerning for septic arthritis but no evidence  of osteomyelitis.  As a result, he was evaluated by orthopedic surgery with plans for irrigation and debridement on 6/7/2019 by Dr. Harris.  An arthrocentesis was also performed and fluid analysis reveals greater than 6000 WBCs and no crystals were noted.  Copious amounts of purulence was noted intraoperatively and down to the deep posterior compartments.  Gram stain is positive for gram-positive cocci with cultures pending.  Patient is currently on vancomycin and ceftriaxone.  Blood cultures are negative to date.  Infectious disease service consulted for further antibiotic recommendations and management.    Review of Systems   Constitutional: Negative for chills and fever.   Respiratory: Negative for cough and shortness of breath.    Cardiovascular: Positive for leg swelling.   Gastrointestinal: Negative for abdominal pain, diarrhea, nausea and vomiting.   Genitourinary: Negative for dysuria.   Musculoskeletal: Positive for joint pain.   Skin: Negative for rash.   Neurological: Positive for sensory change. Negative for dizziness and headaches.   All other systems reviewed and are negative.      PMH:   Past Medical History:   Diagnosis Date   • DVT (deep venous thrombosis) (HCC)    • Hypertension    Recent avulsion fracture of the right femur with hamstring tear  Type 2 diabetes mellitus  Diabetic peripheral neuropathy  Diabetic foot ulcers    PSH:  Past Surgical History:   Procedure Laterality Date   • IRRIGATION & DEBRIDEMENT ORTHO Right 6/7/2019    Procedure: IRRIGATION AND DEBRIDEMENT, WOUND - LEG;  Surgeon: Paoc Harris M.D.;  Location: SURGERY [facility];  Service: Orthopedics   • OTHER     • TOE AMPUTATION         FAMILY HX:  Positive for type 2 diabetes mellitus    SOCIAL HX:  Social History     Social History   • Marital status:      Spouse name: N/A   • Number of children: N/A   • Years of education: N/A     Occupational History   • Not on file.     Social History Main Topics   • Smoking  status: Current Every Day Smoker     Packs/day: 1.00     Types: Cigarettes   • Smokeless tobacco: Former User   • Alcohol use No   • Drug use: Yes     Types: Inhaled      Comment: marijuana   • Sexual activity: Not on file     Other Topics Concern   • Not on file     Social History Narrative   • No narrative on file     History   Smoking Status   • Current Every Day Smoker   • Packs/day: 1.00   • Types: Cigarettes   Smokeless Tobacco   • Former User     History   Alcohol Use No   Patient has a history of smoking meth and cocaine in the 1980s.  Denies IV drug use    Allergies/Intolerances:  No Known Allergies    History reviewed with the patient    Other Current Medications:    Current Facility-Administered Medications:   •  vancomycin 1,800 mg in  mL IVPB, 17 mg/kg, Intravenous, Q12HR, Alicia Montejo M.D., Last Rate: 250 mL/hr at 06/08/19 0539, 1,800 mg at 06/08/19 0539  •  lisinopril (PRINIVIL) tablet 20 mg, 20 mg, Oral, DAILY, Alicia Montejo M.D., 20 mg at 06/08/19 0535  •  morphine ER (MS CONTIN) tablet 15 mg, 15 mg, Oral, Q12HRS, Alicia Montejo M.D., 15 mg at 06/08/19 0535  •  oxyCODONE-acetaminophen (PERCOCET-10)  MG per tablet 1 Tab, 1 Tab, Oral, Q4HRS PRN, Alicia Montejo M.D., 1 Tab at 06/08/19 0348  •  gabapentin (NEURONTIN) capsule 600 mg, 600 mg, Oral, TID, Alicia Montejo M.D., 600 mg at 06/08/19 0535  •  senna-docusate (PERICOLACE or SENOKOT S) 8.6-50 MG per tablet 2 Tab, 2 Tab, Oral, BID **AND** polyethylene glycol/lytes (MIRALAX) PACKET 1 Packet, 1 Packet, Oral, QDAY PRN **AND** magnesium hydroxide (MILK OF MAGNESIA) suspension 30 mL, 30 mL, Oral, QDAY PRN **AND** bisacodyl (DULCOLAX) suppository 10 mg, 10 mg, Rectal, QDAY PRN, Alicia Montejo M.D.  •  NS infusion, , Intravenous, Continuous, Alicia Montejo M.D., Last Rate: 100 mL/hr at 06/08/19 0317  •  acetaminophen (TYLENOL) tablet 650 mg, 650 mg, Oral, Q6HRS PRN, Alicia Montejo M.D.  •  labetalol (NORMODYNE,TRANDATE) injection 10 mg, 10  "mg, Intravenous, Q4HRS PRN, Alicia Montejo M.D.  •  ondansetron (ZOFRAN) syringe/vial injection 4 mg, 4 mg, Intravenous, Q4HRS PRN, Alicia Montejo M.D., 4 mg at 19 1120  •  ondansetron (ZOFRAN ODT) dispertab 4 mg, 4 mg, Oral, Q4HRS PRN, Alicia Montejo M.D.  •  promethazine (PHENERGAN) tablet 12.5-25 mg, 12.5-25 mg, Oral, Q4HRS PRN, Alicia Montejo M.D.  •  promethazine (PHENERGAN) suppository 12.5-25 mg, 12.5-25 mg, Rectal, Q4HRS PRN, Alicia Montejo M.D.  •  prochlorperazine (COMPAZINE) injection 5-10 mg, 5-10 mg, Intravenous, Q4HRS PRN, Alicia Montejo M.D.  •  nicotine (NICODERM) 21 MG/24HR 21 mg, 21 mg, Transdermal, Daily-0600, 21 mg at 19 0535 **AND** Nicotine Replacement Patient Education Materials, , , Once **AND** nicotine polacrilex (NICORETTE) 2 MG piece 2 mg, 2 mg, Oral, Q HOUR PRN, Alicia Montejo M.D.  •  insulin regular (HUMULIN R) injection 2-9 Units, 2-9 Units, Subcutaneous, Q6HRS, Stopped at 19 0600 **AND** Accu-Chek Q6 if NPO, , , Q6H **AND** NOTIFY MD and PharmD, , , Once **AND** glucose 4 g chewable tablet 16 g, 16 g, Oral, Q15 MIN PRN **AND** DEXTROSE 10% BOLUS 250 mL, 250 mL, Intravenous, Q15 MIN PRN, Alicia Montejo M.D.  •  MD Alert...Vancomycin per Pharmacy, , Other, PRN, Alicia Montejo M.D.  •  cefTRIAXone (ROCEPHIN) 2 g in  mL IVPB, 2 g, Intravenous, Q24HRS, LISE BarronD., Stopped at 19 1732  [unfilled]    Most Recent Vital Signs:  /69   Pulse (!) 104   Temp 37.7 °C (99.9 °F) (Temporal)   Resp 16   Ht 1.88 m (6' 2.02\")   Wt 104.2 kg (229 lb 11.5 oz)   SpO2 92%   BMI 29.48 kg/m²   Temp  Av.6 °C (97.9 °F)  Min: 36.1 °C (97 °F)  Max: 37.1 °C (98.8 °F)    Physical Exam:  General: Older than stated age, well nourished, no diaphoresis, well-appearing, no acute distress  HEENT: sclera anicteric, PERRL, extraocular muscles intact, mucous membranes moist, oropharynx clear and moist, no oral lesions or exudate.  Edentulous  Neck: supple, no " lymphadenopathy  Chest: CTAB, no rales, rhonchi or wheezes, normal work of breathing.  Cardiac: regular rate and rhythm, normal S1 S2, no murmurs, rubs or gallops  Abdomen: + bowel sounds, soft, non-tender, non-distended, no hepatosplenomegaly  Extremities: Right lower extremity in primary surgical bandage with Hemovac in place.  Evidence of right second toe distal phalanx amputation site clean.  Bilateral great toe diabetic ulcers with surrounding callus but no drainage, or erythema.  Toes are warm and wiggles  Skin: warm and dry, no rashes or worrisome lesions  Neuro: Alert and oriented times 3, non-focal exam, speech fluent, full range of motion to bilateral upper and lower extremities  Psych: normal mood and behavior, pleasant; memory intact, normal judgement    Pertinent Lab Results:  Recent Labs      06/06/19   1431  06/07/19   0308   WBC  10.4  7.7      Recent Labs      06/06/19   1431  06/07/19   0308  06/08/19   0525   HEMOGLOBIN  10.3*  8.3*  7.4*   HEMATOCRIT  34.0*  27.8*  23.9*   MCV  91.4  92.4  90.5   MCH  27.7  27.6  28.0   PLATELETCT  426  294  259         Recent Labs      06/06/19   1431  06/07/19   0308   SODIUM  136  135   POTASSIUM  4.1  3.8   CHLORIDE  104  106   CO2  22  22   CREATININE  0.81  0.71        Recent Labs      06/06/19   1431   ALBUMIN  3.6        Pertinent Micro:  Results     Procedure Component Value Units Date/Time    GRAM STAIN [[id number]] Collected:  06/07/19 1120    Order Status:  Completed Specimen:  Wound Updated:  06/07/19 1838     Significant Indicator .     Source WND     Site Leg Abscess     Gram Stain Result Many WBCs.  Few Gram positive cocci.      Narrative:       Surgery - swabs received    GRAM STAIN [[id number]] Collected:  06/07/19 1120    Order Status:  Completed Specimen:  Synovial Updated:  06/07/19 1253     Significant Indicator .     Source SYNO     Site Right Knee     Gram Stain Result Few WBCs.  No organisms seen.      Anaerobic Culture [[id number]]  "Collected:  06/07/19 1120    Order Status:  Completed Specimen:  Other Updated:  06/07/19 1239    CULTURE WOUND W/ GRAM STAIN [[id number]] Collected:  06/07/19 1120    Order Status:  Completed Specimen:  Other Updated:  06/07/19 1239    FLUID CULTURE W/GRAM STAIN [[id number]] Collected:  06/07/19 1120    Order Status:  Completed Specimen:  Other Updated:  06/07/19 1231    FLUID CULTURE [[id number]] Collected:  06/07/19 1120    Order Status:  Canceled Specimen:  Other Updated:  06/07/19 1213    Anaerobic Culture [[id number]] Collected:  06/07/19 1120    Order Status:  Completed Specimen:  Other Updated:  06/07/19 1213    BLOOD CULTURE x2 [[id number]] Collected:  06/06/19 1431    Order Status:  Completed Specimen:  Blood from Peripheral Updated:  06/07/19 0836     Significant Indicator NEG     Source BLD     Site PERIPHERAL     Culture Result No Growth  Note: Blood cultures are incubated for 5 days and  are monitored continuously.Positive blood cultures  are called to the RN and reported as soon as  they are identified.      Narrative:       Per Hospital Policy: Only change Specimen Src: to \"Line\" if  specified by physician order.  No site indicated    BLOOD CULTURE x2 [[id number]] Collected:  06/06/19 1526    Order Status:  Completed Specimen:  Blood from Peripheral Updated:  06/07/19 0836     Significant Indicator NEG     Source BLD     Site PERIPHERAL     Culture Result No Growth  Note: Blood cultures are incubated for 5 days and  are monitored continuously.Positive blood cultures  are called to the RN and reported as soon as  they are identified.      Narrative:       Per Hospital Policy: Only change Specimen Src: to \"Line\" if  specified by physician order.  No site indicated        No results found for: BLOODCULTU, BLDCULT, BCHOLD     Studies:  Ct-extremity, Lower With Right    Result Date: 6/6/2019 6/6/2019 5:49 PM HISTORY/REASON FOR EXAM:  Surgical planning for calf abscess. TECHNIQUE/EXAM DESCRIPTION AND NUMBER OF " VIEWS:  CT scan of the RIGHT lower extremity with contrast, with reconstructions. Thin helical 3 mm sections were obtained from the distal femur through the proximal tibia/fibula. Sagittal and coronal multiplanar reconstructions were generated from the axial images. A total of 100 mL of Omnipaque 350 nonionic contrast was administered  IV without complication. Up to date radiation dose reduction adjustments have been utilized to meet ALARA standards for radiation dose reduction. COMPARISON: Radiograph dated 6/6/2019 FINDINGS: There is a large intramuscular abscess in the posterior upper calf, extending to the level of the knee joint. The abscess measures 15.9 cm craniocaudad and 7.8 x 6.1 cm axially. Along its inferior aspect, the abscess extends anteriorly and along the medial tibia. Marked associated muscle edema. Associated skin thickening and cellulitis. There is a moderate to large knee effusion, with marked synovial thickening/hyperenhancement, consistent with septic arthritis. No cortical erosion or destruction to suggest osteomyelitis. Mild patellofemoral osteoarthrosis. Old calcification of MCL. No acute fracture or dislocation identified. Visualized ankle joint spaces are relatively preserved. Several scattered bone islands.     1.  A 15.9 cm abscess in the posterior upper calf as described. 2.  Associated moderate knee effusion with synovial hyperenhancement, consistent with septic arthritis. 3.  No cortical erosion/destruction to suggest osteomyelitis. 4.  No acute fracture or dislocation.    Us-extremity Venous Lower Unilat Right    Result Date: 6/6/2019   Vascular Laboratory  CONCLUSIONS  No evidence of deep venous thrombosis.  Large abscess in the upper calf, discussed on CT.  PATELBINA  Exam Date:     06/06/2019 14:32  Room #:     Inpatient  Priority:     Stat  Ht (in):             Wt (lb):  Ordering Physician:        CARLOS GARCIA  Referring Physician:       [id number] Palmer  Sonographer:                Camila Gunderson  Study Type:                Complete Unilateral  Technical Quality:         Adequate  Age:    53    Gender:     M  MRN:    [id number]  :    1965      BSA:  Indications:     Localized swelling, mass and lump, right lower limb  CPT Codes:       65755  ICD Codes:       R22.41  History:         Right femur fracture, lower extremity pain and localized                   swelling and redness of the proximal calf. Previous DVT per                   patient, patient on eliquis. No prior duplex.  Limitations:     Edema of the proximal calf, unable to visualize the proximal                    posterior tibial and peroneal veins,  PROCEDURES:  Right lower extremity venous duplex imaging.  The following venous structures were evaluated: common femoral, profunda  femoral, greater saphenous, femoral, popliteal , peroneal and posterior  tibial veins.  Serial compression, augmentation maneuvers,  color and spectral Doppler  flow evaluations were performed.  FINDINGS:  Right lower extremity.  No evidence of deep venous thrombosis.  Complete color filling and compressibility with normal venous flow dynamics  including spontaneous flow, response to augmentation maneuvers, and  respiratory phasicity.  The peroneal and posterior tibial veins are difficult to assess for  compressibility, but flow response to augmentation is demonstrated.  Flow was evaluated in the contralateral common femoral vein and normal  venous flow dynamics including spontaneous flow, respiratory phasic  variation and augmentation were demonstrated.  Incidental finding.  A large, avascular, heterogenous, fluid filled structure is visualized in  the left proximal calf.  Ronen Infante MD  (Electronically Signed)  Final Date:      2019                   18:45    Dx-femur-2+ Right    Result Date: 2019 2:53 PM HISTORY/REASON FOR EXAM:  Pain/Deformity Following Trauma. TECHNIQUE/EXAM DESCRIPTION AND NUMBER OF VIEWS:   2 views of the RIGHT femur. COMPARISON: None. FINDINGS: BONE MINERALIZATION: Normal. JOINTS: Mild right hip osteoarthrosis. Mild patellofemoral arthrosis. No erosions. FRACTURE: None. DISLOCATION: None. SOFT TISSUES: No mass. Old calcification of the medial collateral ligament of the knee.     Mild right hip osteoarthrosis. No acute fracture or dislocation.      IMPRESSION:   1.  Right lower extremity abscess    2.  Right knee effusion  3.  Type 2 diabetes mellitus  4.  Peripheral neuropathy      PLAN:   Rudy Pérez is a 53 y.o. man with a history of type 2 diabetes mellitus complicated by peripheral neuropathy, and recent avulsion fracture of the right distal femur and hamstring tear complicated by DVT on Eliquis admitted for acute onset of right knee and leg pain, swelling and erythema.  Patient found to have a 15.9 cm abscess on imaging.  He is now status post right knee arthrocentesis and right leg abscess incision and drainage by Dr. Harris on 6/7/2019.  Gross purulence was noted intraoperatively in the deep compartments.  Gram stain is positive for gram-positive cocci with cultures pending.  Agree with vancomycin and ceftriaxone for now pending further cultures.  Monitor renal function and Vanco trough.  His Vanco trough is elevated today at 25.1.  Blood cultures are negative to date.  Anticipate at least 2 weeks of IV antibiotics given the depth of infection and pending clinical improvement.  He will also need tight blood sugar control to help control his current infection.  Hemoglobin A1c is pending.  Further recommendations per hospital course and culture results.      Plan of care discussed with MARIA M De Santiago M.D.. Will continue to follow    Oriana Parmar M.D.      Please note that this dictation was created using voice recognition software. I have worked with technical experts from [employer] to optimize the interface.  I have made every reasonable attempt to correct obvious errors, but  there may be errors of grammar and possibly content that I did not discover before finalizing the note.

## 2019-06-08 NOTE — PROGRESS NOTES
"   Orthopaedic Progress Note    Interval changes:  Patient doing well  HV in place -185cc output over last 24hours  Hold blood thinners for 72 hours    ROS - Patient denies any new issues.  Pain well controlled.    /72   Pulse 94   Temp 36.4 °C (97.6 °F) (Temporal)   Resp 16   Ht 1.88 m (6' 2.02\")   Wt 107.9 kg (237 lb 14 oz)   SpO2 92%       Patient seen and examined  No acute distress  Breathing non labored  RRR  RLE Surgical dressing is clean, dry, and intact. HV in place with sanguinous exudate noted.  Patient clearly fires tibialis anterior, EHL, and gastrocnemius/soleus. Sensation is intact to light touch throughout superficial peroneal, deep peroneal, tibial, saphenous, and sural nerve distributions. Strong and palpable 2+ dorsalis pedis and posterior tibial pulses with capillary refill less than 2 seconds. No lower leg tenderness or discomfort.       Recent Labs      06/06/19   1431  06/07/19   0308  06/08/19   0525   WBC  10.4  7.7  6.6   RBC  3.72*  3.01*  2.64*   HEMOGLOBIN  10.3*  8.3*  7.4*   HEMATOCRIT  34.0*  27.8*  23.9*   MCV  91.4  92.4  90.5   MCH  27.7  27.6  28.0   MCHC  30.3*  29.9*  31.0*   RDW  51.8*  53.0*  52.0*   PLATELETCT  426  294  259   MPV  9.1  9.1  9.2       Active Hospital Problems    Diagnosis   • Abscess of calf [L02.419]     Priority: High   • Pyogenic arthritis of right knee joint ([facility]) [M00.9]     Priority: High   • DVT (deep venous thrombosis) ([facility]) [I82.409]     Priority: Medium   • Diabetes mellitus type 2 in nonobese ([facility]) [E11.9]     Priority: Medium   • Hypertension [I10]     Priority: Low   • Chronic back pain [M54.9, G89.29]     Priority: Low   • Normochromic normocytic anemia [D64.9]     Priority: Low   • Tachycardia [R00.0]     Priority: Low   • Tobacco dependence [F17.200]     Priority: Low       Assessment/Plan:  HV in place until output less than 30 cc in 24 hours  POD#1 S/P:  1.  Right knee arthrocentesis.  2.  Right leg abscess incision and " drainage  Wt bearing status - WBAT  Wound care/Drains - HV in place until output less than 30 cc in 24 hours   Future Procedures - none planned   Sutures/Staples out- 10-14 days post operatively  PT/OT-initiated  Antibiotics: rocphin 2g IV QD, vanco 1800mg IV BID  DVT Prophylaxis- TEDS/SCDs/Foot pumps  [place]-none  Case Coordination for Discharge Planning - Disposition pending abx needs

## 2019-06-08 NOTE — PROGRESS NOTES
2 RN skin check completed with EVANGELIST Cummings  Devices in place SCD on LLE only, dressing on RLE with hemovac   Skin assessed under devices LLE scd  Confirmed pressure ulcers found on n/a  New potential pressure ulcers noted on n/a.   The following interventions in place education about repositioning, mobilizing, and using moisturizer and barrier cream at bedside.   Pt. Buttocks is pink and blanching, bilateral elbows pink and blanching, Left great toe old pressure ulcer scar located under that is dry and open to air. R great toe has old pressure ulcer scar also located under that is dry and open to air. Pictures were taken and uploaded to [facility].

## 2019-06-08 NOTE — PROGRESS NOTES
Hospital Medicine Daily Progress Note    Date of Service  6/8/2019    Chief Complaint  Right leg pain, swelling, redness    Hospital Course   Mr. Pérez is a 53-year-old male who presented to the emergency department on 6/6/2019 with complaints of worsening right leg pain, swelling, and erythema. Of note, he was diagnosed with a blood clot approximately 2 months ago and has been maintained on Eliquis in addition to being in a hinged knee brace due to femur fracture. He was advised by his home health RN to come into the hospital for evaluation of his leg.  Ultrasound of the affected leg showed a calf mass most consistent with deep abscess so the patient was placed on IV ceftriaxone and vancomycin, and orthopedic surgery was consulted.  A CT was done and showed a 15.9 cm abscess in the posterior upper calf with associated moderate right knee effusion and synovial hyperenhancement consistent with septic arthritis. On 6/7/19 he was taken for I&D of the right leg by Dr. Harris. Synovial fluid was sent for analysis and cultures are pending. Infectious disease was also consulted.       Interval Problem Update  Infected hematoma-pain is decently controlled. No culture data back yet. Afebrile on current IV abx's. Has some mild itching with the narcotics.     Consultants/Specialty  Orthopedic surgery  Infectious disease    Code Status  Full code    Disposition  Clinical for now.  Recently discharged from [facility] with outpatient physical therapy and home health.  Adamantly refusing to return to [facility] if SNF is ultimately recommended.     Review of Systems  Review of Systems   Constitutional: Negative for fever.   HENT: Negative for hearing loss.    Respiratory: Negative for shortness of breath.    Cardiovascular: Positive for leg swelling. Negative for chest pain.        Unchanged   Gastrointestinal: Negative for nausea and vomiting.   Genitourinary: Negative for dysuria.   Musculoskeletal: Positive for joint  pain (right knee, severe).        Moderate pain today   Skin: Negative for itching and rash.   Neurological: Positive for tingling (BLE numbness at baseline, more pronounced in his feet) and weakness. Negative for dizziness and sensory change.   Endo/Heme/Allergies: Does not bruise/bleed easily.   Psychiatric/Behavioral: Negative for depression.   All other systems reviewed and are negative.     Physical Exam  Temp:  [36.3 °C (97.3 °F)-37.7 °C (99.9 °F)] 36.3 °C (97.3 °F)  Pulse:  [] 80  Resp:  [16-18] 17  BP: (110-153)/(60-83) 153/83  SpO2:  [92 %-99 %] 95 %    Physical Exam   Constitutional: He is oriented to person, place, and time. He appears well-developed and well-nourished. He is active and cooperative. He appears ill (chronically ill-appearing). Nasal cannula in place.   HENT:   Head: Normocephalic and atraumatic.   Eyes: Pupils are equal, round, and reactive to light. Conjunctivae and EOM are normal. Right eye exhibits no discharge. Left eye exhibits no discharge.   Neck: Normal range of motion. Neck supple.   Cardiovascular: Normal rate, regular rhythm, normal heart sounds and intact distal pulses.    No murmur heard.  Pulmonary/Chest: Effort normal. No accessory muscle usage or stridor. No respiratory distress. He has no decreased breath sounds. He has no rhonchi.   Abdominal: Soft. Bowel sounds are normal. He exhibits no distension and no abdominal bruit. There is no rigidity and no guarding.   Musculoskeletal: Normal range of motion. He exhibits edema (RLE).   Wrapped in gauze with hemovac in place, minimal output   Neurological: He is alert and oriented to person, place, and time. GCS eye subscore is 4. GCS verbal subscore is 5. GCS motor subscore is 6.   Skin: Skin is warm and dry. He is not diaphoretic. No erythema. No pallor.        Psychiatric: He has a normal mood and affect. His speech is normal and behavior is normal. Judgment and thought content normal. Cognition and memory are normal.      Nursing note and vitals reviewed.    Fluids    Intake/Output Summary (Last 24 hours) at 06/08/19 1734  Last data filed at 06/08/19 1500   Gross per 24 hour   Intake                0 ml   Output             2045 ml   Net            -2045 ml     Laboratory  Recent Labs      06/06/19   1431  06/07/19   0308  06/08/19   0525   WBC  10.4  7.7  6.6   RBC  3.72*  3.01*  2.64*   HEMOGLOBIN  10.3*  8.3*  7.4*   HEMATOCRIT  34.0*  27.8*  23.9*   MCV  91.4  92.4  90.5   MCH  27.7  27.6  28.0   MCHC  30.3*  29.9*  31.0*   RDW  51.8*  53.0*  52.0*   PLATELETCT  426  294  259   MPV  9.1  9.1  9.2     Recent Labs      06/06/19   1431  06/07/19   0308  06/08/19   0525   SODIUM  136  135  133*   POTASSIUM  4.1  3.8  4.1   CHLORIDE  104  106  105   CO2  22  22  21   GLUCOSE  210*  149*  148*   BUN  17  15  14   CREATININE  0.81  0.71  0.67   CALCIUM  9.1  8.1*  8.1*     Imaging  US-EXTREMITY VENOUS LOWER UNILAT RIGHT   Final Result      CT-EXTREMITY, LOWER WITH RIGHT   Final Result      1.  A 15.9 cm abscess in the posterior upper calf as described.   2.  Associated moderate knee effusion with synovial hyperenhancement, consistent with septic arthritis.   3.  No cortical erosion/destruction to suggest osteomyelitis.   4.  No acute fracture or dislocation.      DX-FEMUR-2+ RIGHT   Final Result      Mild right hip osteoarthrosis. No acute fracture or dislocation.         Assessment/Plan  * Abscess of calf- (present on admission)   Assessment & Plan    S/p I&D POD#1, doing well, maintain hemovac for now  Continue IV vanco & ceftriaxone for now pending evaluation by infectious disease.   Intraop cultures pending.   Continue pain control.      Pyogenic arthritis of right knee joint (HCC)- (present on admission)   Assessment & Plan    Currently on IV vancomycin & ceftriaxone. ID consulted.   Intraop cultures pending, no final speciation yet  -goal vanco trough 15-20, check daily, adjust PRN  Continue pain control.      DVT (deep venous  thrombosis) (HCC)- (present on admission)   Assessment & Plan    Maintained on eliquis PTA. Will resume once cleared by surgery, usually at least 5 days from the day of surgery.   DVT prophylaxis can usually be resumed within 36 hours, per Giuliano ARROYO, but will wait on their clearance to start it postop.   SCD to the LLE.  -hemovac output is appropriate at this time     Diabetes mellitus type 2 in nonobese (HCC)- (present on admission)   Assessment & Plan    -164 overnight. Maintained on metformin & glipizide at home PTA. Continue holding for now.   Continue accuchecks ACHS with SSI as required.   Continue diabetic diet.   Obtain updated A1C in a.m.  Hypoglycemia protocol in place if needed.  -sugars are doing well at this time     Chronic back pain- (present on admission)   Assessment & Plan    Maintained on scheduled MS Contin & prn flexeril, motrin (monitor kidney function closely), & percocet at home.   Continue MS Contin & prn percocet. Adjust as necessary.   Spoke with the bedside RN & patient about getting consistent pain intervention, including non-pharmacologic modalities.   Continue to monitor closely.   -no further adjustments needed at this time     Hypertension- (present on admission)   Assessment & Plan    SBP overnight 100s-teens.  Continue home lisinopril.  Trend SBP's per unit protocol.     Tobacco dependence- (present on admission)   Assessment & Plan    Continue to encourage the importance of cessation.   Nicotine replacement protocol in place.     Tachycardia- (present on admission)   Assessment & Plan    Improving with pain management     Normochromic normocytic anemia- (present on admission)   Assessment & Plan    Anemia workup with tomorrow's labs.   H/H stable for now.         VTE prophylaxis: SCD's. Currently holding Eliquis.  Per doyle Nobles, NOAC's usually held for 5 days postop & prophylactic AC usually held for 36 hours. Will wait for their ok to start/resume.

## 2019-06-08 NOTE — PROGRESS NOTES
Case discussed with Dr Harris due to recent eliquis use and due to large size of abscess cavity encountered he recommends 48-72 hour before resuming thinners

## 2019-06-08 NOTE — CARE PLAN
Problem: Fluid Volume:  Goal: Will maintain balanced intake and output  Outcome: PROGRESSING AS EXPECTED  Pt. Is tolerating fluids and meals, educated about toileting, educated about meals, check MAR for continous fluids    Problem: Venous Thromboembolism (VTW)/Deep Vein Thrombosis (DVT) Prevention:  Goal: Patient will participate in Venous Thrombosis (VTE)/Deep Vein Thrombosis (DVT)Prevention Measures  Outcome: PROGRESSING AS EXPECTED  Pt. Is not currently on VTE prophylaxis medication due to MD contraindicating it due to surgery, pt. Is repositioning and mobilizing.

## 2019-06-09 LAB
ANION GAP SERPL CALC-SCNC: 6 MMOL/L (ref 0–11.9)
BACTERIA WND AEROBE CULT: ABNORMAL
BACTERIA WND AEROBE CULT: ABNORMAL
BASOPHILS # BLD AUTO: 0.9 % (ref 0–1.8)
BASOPHILS # BLD: 0.05 K/UL (ref 0–0.12)
BUN SERPL-MCNC: 11 MG/DL (ref 8–22)
CALCIUM SERPL-MCNC: 8.4 MG/DL (ref 8.5–10.5)
CHLORIDE SERPL-SCNC: 105 MMOL/L (ref 96–112)
CO2 SERPL-SCNC: 23 MMOL/L (ref 20–33)
CREAT SERPL-MCNC: 0.63 MG/DL (ref 0.5–1.4)
EOSINOPHIL # BLD AUTO: 0.48 K/UL (ref 0–0.51)
EOSINOPHIL NFR BLD: 8.4 % (ref 0–6.9)
ERYTHROCYTE [DISTWIDTH] IN BLOOD BY AUTOMATED COUNT: 51 FL (ref 35.9–50)
GLUCOSE BLD-MCNC: 120 MG/DL (ref 65–99)
GLUCOSE BLD-MCNC: 180 MG/DL (ref 65–99)
GLUCOSE BLD-MCNC: 181 MG/DL (ref 65–99)
GLUCOSE SERPL-MCNC: 136 MG/DL (ref 65–99)
GRAM STN SPEC: ABNORMAL
HCT VFR BLD AUTO: 24.2 % (ref 42–52)
HGB BLD-MCNC: 7.5 G/DL (ref 14–18)
IMM GRANULOCYTES # BLD AUTO: 0.01 K/UL (ref 0–0.11)
IMM GRANULOCYTES NFR BLD AUTO: 0.2 % (ref 0–0.9)
LYMPHOCYTES # BLD AUTO: 1.28 K/UL (ref 1–4.8)
LYMPHOCYTES NFR BLD: 22.5 % (ref 22–41)
MCH RBC QN AUTO: 27.7 PG (ref 27–33)
MCHC RBC AUTO-ENTMCNC: 31 G/DL (ref 33.7–35.3)
MCV RBC AUTO: 89.3 FL (ref 81.4–97.8)
MONOCYTES # BLD AUTO: 0.38 K/UL (ref 0–0.85)
MONOCYTES NFR BLD AUTO: 6.7 % (ref 0–13.4)
NEUTROPHILS # BLD AUTO: 3.49 K/UL (ref 1.82–7.42)
NEUTROPHILS NFR BLD: 61.3 % (ref 44–72)
NRBC # BLD AUTO: 0 K/UL
NRBC BLD-RTO: 0 /100 WBC
PLATELET # BLD AUTO: 247 K/UL (ref 164–446)
PMV BLD AUTO: 9 FL (ref 9–12.9)
POTASSIUM SERPL-SCNC: 4.1 MMOL/L (ref 3.6–5.5)
RBC # BLD AUTO: 2.71 M/UL (ref 4.7–6.1)
SIGNIFICANT IND 70042: ABNORMAL
SITE SITE: ABNORMAL
SODIUM SERPL-SCNC: 134 MMOL/L (ref 135–145)
SOURCE SOURCE: ABNORMAL
WBC # BLD AUTO: 5.7 K/UL (ref 4.8–10.8)

## 2019-06-09 PROCEDURE — 700111 HCHG RX REV CODE 636 W/ 250 OVERRIDE (IP): Performed by: INTERNAL MEDICINE

## 2019-06-09 PROCEDURE — A9270 NON-COVERED ITEM OR SERVICE: HCPCS | Performed by: HOSPITALIST

## 2019-06-09 PROCEDURE — 700102 HCHG RX REV CODE 250 W/ 637 OVERRIDE(OP): Performed by: INTERNAL MEDICINE

## 2019-06-09 PROCEDURE — 700102 HCHG RX REV CODE 250 W/ 637 OVERRIDE(OP): Performed by: HOSPITALIST

## 2019-06-09 PROCEDURE — 99233 SBSQ HOSP IP/OBS HIGH 50: CPT | Performed by: INTERNAL MEDICINE

## 2019-06-09 PROCEDURE — A9270 NON-COVERED ITEM OR SERVICE: HCPCS | Performed by: PHYSICIAN ASSISTANT

## 2019-06-09 PROCEDURE — 770006 HCHG ROOM/CARE - MED/SURG/GYN SEMI*

## 2019-06-09 PROCEDURE — 99232 SBSQ HOSP IP/OBS MODERATE 35: CPT | Performed by: INTERNAL MEDICINE

## 2019-06-09 PROCEDURE — 82962 GLUCOSE BLOOD TEST: CPT | Mod: 91

## 2019-06-09 PROCEDURE — 80048 BASIC METABOLIC PNL TOTAL CA: CPT

## 2019-06-09 PROCEDURE — A9270 NON-COVERED ITEM OR SERVICE: HCPCS | Performed by: INTERNAL MEDICINE

## 2019-06-09 PROCEDURE — 700105 HCHG RX REV CODE 258: Performed by: HOSPITALIST

## 2019-06-09 PROCEDURE — 700102 HCHG RX REV CODE 250 W/ 637 OVERRIDE(OP): Performed by: PHYSICIAN ASSISTANT

## 2019-06-09 PROCEDURE — 700105 HCHG RX REV CODE 258: Performed by: INTERNAL MEDICINE

## 2019-06-09 PROCEDURE — 85025 COMPLETE CBC W/AUTO DIFF WBC: CPT

## 2019-06-09 PROCEDURE — 36415 COLL VENOUS BLD VENIPUNCTURE: CPT

## 2019-06-09 RX ORDER — CEFAZOLIN SODIUM 2 G/100ML
2 INJECTION, SOLUTION INTRAVENOUS EVERY 8 HOURS
Status: DISCONTINUED | OUTPATIENT
Start: 2019-06-09 | End: 2019-06-13

## 2019-06-09 RX ORDER — ACETAMINOPHEN 500 MG
1000 TABLET ORAL EVERY 6 HOURS
Status: DISCONTINUED | OUTPATIENT
Start: 2019-06-09 | End: 2019-06-13 | Stop reason: HOSPADM

## 2019-06-09 RX ORDER — DIPHENHYDRAMINE HCL 25 MG
25 TABLET ORAL EVERY 6 HOURS PRN
Status: DISCONTINUED | OUTPATIENT
Start: 2019-06-09 | End: 2019-06-13 | Stop reason: HOSPADM

## 2019-06-09 RX ORDER — CEFAZOLIN SODIUM 1 G/50ML
1 INJECTION, SOLUTION INTRAVENOUS EVERY 8 HOURS
Status: DISCONTINUED | OUTPATIENT
Start: 2019-06-09 | End: 2019-06-09

## 2019-06-09 RX ADMIN — GABAPENTIN 600 MG: 300 CAPSULE ORAL at 05:29

## 2019-06-09 RX ADMIN — OXYCODONE HYDROCHLORIDE AND ACETAMINOPHEN 1 TABLET: 10; 325 TABLET ORAL at 00:50

## 2019-06-09 RX ADMIN — INSULIN HUMAN 2 UNITS: 100 INJECTION, SOLUTION PARENTERAL at 13:31

## 2019-06-09 RX ADMIN — MORPHINE SULFATE 15 MG: 15 TABLET, EXTENDED RELEASE ORAL at 17:42

## 2019-06-09 RX ADMIN — DIPHENHYDRAMINE HCL 25 MG: 25 TABLET ORAL at 12:56

## 2019-06-09 RX ADMIN — SODIUM CHLORIDE: 9 INJECTION, SOLUTION INTRAVENOUS at 03:20

## 2019-06-09 RX ADMIN — LISINOPRIL 20 MG: 20 TABLET ORAL at 05:29

## 2019-06-09 RX ADMIN — CEFAZOLIN SODIUM 2 G: 2 INJECTION, SOLUTION INTRAVENOUS at 14:38

## 2019-06-09 RX ADMIN — OXYCODONE HYDROCHLORIDE AND ACETAMINOPHEN 1 TABLET: 10; 325 TABLET ORAL at 14:37

## 2019-06-09 RX ADMIN — MORPHINE SULFATE 15 MG: 15 TABLET, EXTENDED RELEASE ORAL at 05:29

## 2019-06-09 RX ADMIN — MAGNESIUM 64 MG (MAGNESIUM CHLORIDE) TABLET,DELAYED RELEASE 64 MG: at 17:42

## 2019-06-09 RX ADMIN — OXYCODONE HYDROCHLORIDE AND ACETAMINOPHEN 1 TABLET: 10; 325 TABLET ORAL at 06:33

## 2019-06-09 RX ADMIN — NICOTINE 21 MG: 21 PATCH, EXTENDED RELEASE TRANSDERMAL at 05:30

## 2019-06-09 RX ADMIN — OXYCODONE HYDROCHLORIDE AND ACETAMINOPHEN 1 TABLET: 10; 325 TABLET ORAL at 10:35

## 2019-06-09 RX ADMIN — ACETAMINOPHEN 1000 MG: 500 TABLET ORAL at 17:42

## 2019-06-09 RX ADMIN — OXYCODONE HYDROCHLORIDE AND ACETAMINOPHEN 1 TABLET: 10; 325 TABLET ORAL at 20:15

## 2019-06-09 RX ADMIN — HYDROXYZINE HYDROCHLORIDE 10 MG: 10 TABLET, FILM COATED ORAL at 10:35

## 2019-06-09 RX ADMIN — VANCOMYCIN HYDROCHLORIDE 1100 MG: 100 INJECTION, POWDER, LYOPHILIZED, FOR SOLUTION INTRAVENOUS at 10:36

## 2019-06-09 RX ADMIN — INSULIN HUMAN 2 UNITS: 100 INJECTION, SOLUTION PARENTERAL at 18:42

## 2019-06-09 RX ADMIN — GABAPENTIN 600 MG: 300 CAPSULE ORAL at 12:56

## 2019-06-09 RX ADMIN — MAGNESIUM 64 MG (MAGNESIUM CHLORIDE) TABLET,DELAYED RELEASE 64 MG: at 05:29

## 2019-06-09 RX ADMIN — ACETAMINOPHEN 1000 MG: 500 TABLET ORAL at 12:56

## 2019-06-09 RX ADMIN — CEFAZOLIN SODIUM 2 G: 2 INJECTION, SOLUTION INTRAVENOUS at 21:52

## 2019-06-09 RX ADMIN — GABAPENTIN 600 MG: 300 CAPSULE ORAL at 17:42

## 2019-06-09 ASSESSMENT — ENCOUNTER SYMPTOMS
MYALGIAS: 1
HEADACHES: 0
PALPITATIONS: 0
TINGLING: 1
NAUSEA: 0
SHORTNESS OF BREATH: 0
ABDOMINAL PAIN: 0
FEVER: 0
WEAKNESS: 1
CHILLS: 0
VOMITING: 0
DIZZINESS: 0
SENSORY CHANGE: 0
DIARRHEA: 0
COUGH: 0
DEPRESSION: 0

## 2019-06-09 NOTE — PROGRESS NOTES
Infectious Disease Progress Note    Author: Oriana Parmar M.D. Date & Time of service: 2019  11:08 AM    Chief Complaint:  Follow-up for right lower extremity abscess and right knee effusion    Interval History:  53 y.o. man with a history of type 2 diabetes mellitus complicated by diabetic peripheral neuropathy, recent hamstring tear with avulsion fracture of the distal right femur complicated by a DVT on Eliquis and hypertension admitted 2019 for worsening right calf pain and swelling.  He was found to have a right knee effusion and right lower extremity abscess.  He is now status post arthrocentesis and IND on 2019 by Dr. Harris.  Operative cultures are growing Staphylococcus aureus.     afebrile WBC 5.7.  Patient has ongoing right lower extremity pain 5 out of 10 in severity, associated with intermittent sharp shooting pain.  Tolerating IV antibiotics without any issues.  No diarrhea.    Labs Reviewed.    Review of Systems:  Review of Systems   Constitutional: Negative for chills and fever.   Respiratory: Negative for cough and shortness of breath.    Cardiovascular: Positive for leg swelling.   Gastrointestinal: Negative for abdominal pain, diarrhea, nausea and vomiting.   Musculoskeletal: Positive for myalgias.   Neurological: Negative for dizziness and headaches.   All other systems reviewed and are negative.      Hemodynamics:  Temp (24hrs), Av.4 °C (97.5 °F), Min:36.1 °C (96.9 °F), Max:36.7 °C (98 °F)  Temperature: 36.1 °C (96.9 °F)  Pulse  Av  Min: 79  Max: 115   Blood Pressure: 108/53       Physical Exam:  Physical Exam   Constitutional: He is oriented to person, place, and time.   HENT:   Mouth/Throat: No oropharyngeal exudate.   Edentulous   Eyes: Pupils are equal, round, and reactive to light. Conjunctivae are normal.   Neck: Neck supple.   Cardiovascular: Normal rate, regular rhythm and normal heart sounds.    Pulmonary/Chest: Effort normal. He has no wheezes. He has no  rales.   Abdominal: There is no tenderness.   Musculoskeletal: He exhibits edema.   Right lower extremity in primary surgical dressing.+ Hemovac  Edema extending down to the dorsum of his right foot   Neurological: He is alert and oriented to person, place, and time.   Psychiatric: He has a normal mood and affect. His behavior is normal.       Meds:    Current Facility-Administered Medications:   •  acetaminophen  •  magnesium chloride  •  hydrOXYzine HCl  •  vancomycin  •  lisinopril  •  morphine ER  •  oxyCODONE-acetaminophen  •  gabapentin  •  senna-docusate **AND** polyethylene glycol/lytes **AND** magnesium hydroxide **AND** bisacodyl  •  labetalol  •  ondansetron  •  ondansetron  •  promethazine  •  promethazine  •  prochlorperazine  •  nicotine **AND** Nicotine Replacement Patient Education Materials **AND** nicotine polacrilex  •  insulin regular **AND** Accu-Chek Q6 if NPO **AND** NOTIFY MD and PharmD **AND** glucose **AND** dextrose 10% bolus  •  MD Alert...Vancomycin per Pharmacy  •  cefTRIAXone (ROCEPHIN) IV    Labs:  Recent Labs      06/06/19   1431  06/07/19   0308  06/07/19   1120  06/08/19   0525  06/09/19   0313   WBC  10.4  7.7   --   6.6  5.7   RBC  3.72*  3.01*   --   2.64*  2.71*   HEMOGLOBIN  10.3*  8.3*   --   7.4*  7.5*   HEMATOCRIT  34.0*  27.8*   --   23.9*  24.2*   MCV  91.4  92.4   --   90.5  89.3   MCH  27.7  27.6   --   28.0  27.7   RDW  51.8*  53.0*   --   52.0*  51.0*   PLATELETCT  426  294   --   259  247   MPV  9.1  9.1   --   9.2  9.0   NEUTSPOLYS  72.80*   --    --   67.20  61.30   LYMPHOCYTES  15.80*   --    --   18.60*  22.50   MONOCYTES  4.40   --    --   6.10  6.70   EOSINOPHILS  6.20   --   6  7.50*  8.40*   BASOPHILS  0.50   --    --   0.30  0.90     Recent Labs      06/07/19   0308  06/08/19   0525  06/09/19   0313   SODIUM  135  133*  134*   POTASSIUM  3.8  4.1  4.1   CHLORIDE  106  105  105   CO2  22  21  23   GLUCOSE  149*  148*  136*   BUN  15  14  11     Recent Labs       06/06/19   1431  06/07/19   0308  06/08/19   0525  06/09/19   0313   ALBUMIN  3.6   --    --    --    TBILIRUBIN  0.3   --    --    --    ALKPHOSPHAT  75   --    --    --    TOTPROTEIN  7.4   --    --    --    ALTSGPT  8   --    --    --    ASTSGOT  9*   --    --    --    CREATININE  0.81  0.71  0.67  0.63       Imaging:  Ct-extremity, Lower With Right    Result Date: 6/6/2019 6/6/2019 5:49 PM HISTORY/REASON FOR EXAM:  Surgical planning for calf abscess. TECHNIQUE/EXAM DESCRIPTION AND NUMBER OF VIEWS:  CT scan of the RIGHT lower extremity with contrast, with reconstructions. Thin helical 3 mm sections were obtained from the distal femur through the proximal tibia/fibula. Sagittal and coronal multiplanar reconstructions were generated from the axial images. A total of 100 mL of Omnipaque 350 nonionic contrast was administered  IV without complication. Up to date radiation dose reduction adjustments have been utilized to meet ALARA standards for radiation dose reduction. COMPARISON: Radiograph dated 6/6/2019 FINDINGS: There is a large intramuscular abscess in the posterior upper calf, extending to the level of the knee joint. The abscess measures 15.9 cm craniocaudad and 7.8 x 6.1 cm axially. Along its inferior aspect, the abscess extends anteriorly and along the medial tibia. Marked associated muscle edema. Associated skin thickening and cellulitis. There is a moderate to large knee effusion, with marked synovial thickening/hyperenhancement, consistent with septic arthritis. No cortical erosion or destruction to suggest osteomyelitis. Mild patellofemoral osteoarthrosis. Old calcification of MCL. No acute fracture or dislocation identified. Visualized ankle joint spaces are relatively preserved. Several scattered bone islands.     1.  A 15.9 cm abscess in the posterior upper calf as described. 2.  Associated moderate knee effusion with synovial hyperenhancement, consistent with septic arthritis. 3.  No cortical  erosion/destruction to suggest osteomyelitis. 4.  No acute fracture or dislocation.    Us-extremity Venous Lower Unilat Right    Result Date: 2019   Vascular Laboratory  CONCLUSIONS  No evidence of deep venous thrombosis.  Large abscess in the upper calf, discussed on CT.  BINA PATEL  Exam Date:     2019 14:32  Room #:     Inpatient  Priority:     Stat  Ht (in):             Wt (lb):  Ordering Physician:        CARLOS GARCIA  Referring Physician:       JOSE Tafoya  Sonographer:               Camila Gunderson  Study Type:                Complete Unilateral  Technical Quality:         Adequate  Age:    53    Gender:     M  MRN:    [id number]  :    1965      BSA:  Indications:     Localized swelling, mass and lump, right lower limb  CPT Codes:       24079  ICD Codes:       R22.41  History:         Right femur fracture, lower extremity pain and localized                   swelling and redness of the proximal calf. Previous DVT per                   patient, patient on eliquis. No prior duplex.  Limitations:     Edema of the proximal calf, unable to visualize the proximal                    posterior tibial and peroneal veins,  PROCEDURES:  Right lower extremity venous duplex imaging.  The following venous structures were evaluated: common femoral, profunda  femoral, greater saphenous, femoral, popliteal , peroneal and posterior  tibial veins.  Serial compression, augmentation maneuvers,  color and spectral Doppler  flow evaluations were performed.  FINDINGS:  Right lower extremity.  No evidence of deep venous thrombosis.  Complete color filling and compressibility with normal venous flow dynamics  including spontaneous flow, response to augmentation maneuvers, and  respiratory phasicity.  The peroneal and posterior tibial veins are difficult to assess for  compressibility, but flow response to augmentation is demonstrated.  Flow was evaluated in the contralateral common femoral vein and normal   venous flow dynamics including spontaneous flow, respiratory phasic  variation and augmentation were demonstrated.  Incidental finding.  A large, avascular, heterogenous, fluid filled structure is visualized in  the left proximal calf.  Ronen Infante MD  (Electronically Signed)  Final Date:      06 June 2019                   18:45    Dx-femur-2+ Right    Result Date: 6/6/2019 6/6/2019 2:53 PM HISTORY/REASON FOR EXAM:  Pain/Deformity Following Trauma. TECHNIQUE/EXAM DESCRIPTION AND NUMBER OF VIEWS:  2 views of the RIGHT femur. COMPARISON: None. FINDINGS: BONE MINERALIZATION: Normal. JOINTS: Mild right hip osteoarthrosis. Mild patellofemoral arthrosis. No erosions. FRACTURE: None. DISLOCATION: None. SOFT TISSUES: No mass. Old calcification of the medial collateral ligament of the knee.     Mild right hip osteoarthrosis. No acute fracture or dislocation.      Micro:  Results     Procedure Component Value Units Date/Time    FLUID CULTURE W/GRAM STAIN [[id number]] Collected:  06/07/19 1120    Order Status:  Completed Specimen:  Synovial Updated:  06/09/19 1106     Significant Indicator NEG     Source SYNO     Site Right Knee     Culture Result No growth at 48 hours.     Gram Stain Result Few WBCs.  No organisms seen.      Anaerobic Culture [[id number]] Collected:  06/07/19 1120    Order Status:  Completed Specimen:  Synovial Updated:  06/09/19 1106     Significant Indicator NEG     Source SYNO     Site Right Knee     Culture Result Culture in progress.    CULTURE WOUND W/ GRAM STAIN [[id number]]  (Abnormal) Collected:  06/07/19 1120    Order Status:  Completed Specimen:  Wound Updated:  06/08/19 1608     Significant Indicator POS (POS)     Source WND     Site Leg Abscess     Culture Result - (A)     Gram Stain Result Many WBCs.  Few Gram positive cocci.   (A)     Culture Result Staphylococcus aureus  Light growth   (A)    Narrative:       Surgery - swabs received    Anaerobic Culture [[id number]] Collected:  06/07/19  "1120    Order Status:  Completed Specimen:  Wound Updated:  06/08/19 1608     Significant Indicator NEG     Source WND     Site Leg Abscess     Culture Result Culture in progress.    Narrative:       Surgery - swabs received    GRAM STAIN [[id number]] Collected:  06/07/19 1120    Order Status:  Completed Specimen:  Wound Updated:  06/07/19 1838     Significant Indicator .     Source WND     Site Leg Abscess     Gram Stain Result Many WBCs.  Few Gram positive cocci.      Narrative:       Surgery - swabs received    GRAM STAIN [[id number]] Collected:  06/07/19 1120    Order Status:  Completed Specimen:  Synovial Updated:  06/07/19 1253     Significant Indicator .     Source SYNO     Site Right Knee     Gram Stain Result Few WBCs.  No organisms seen.      FLUID CULTURE [[id number]] Collected:  06/07/19 1120    Order Status:  Canceled Specimen:  Other Updated:  06/07/19 1213    BLOOD CULTURE x2 [[id number]] Collected:  06/06/19 1431    Order Status:  Completed Specimen:  Blood from Peripheral Updated:  06/07/19 0836     Significant Indicator NEG     Source BLD     Site PERIPHERAL     Culture Result No Growth  Note: Blood cultures are incubated for 5 days and  are monitored continuously.Positive blood cultures  are called to the RN and reported as soon as  they are identified.      Narrative:       Per Hospital Policy: Only change Specimen Src: to \"Line\" if  specified by physician order.  No site indicated    BLOOD CULTURE x2 [[id number]] Collected:  06/06/19 1526    Order Status:  Completed Specimen:  Blood from Peripheral Updated:  06/07/19 0836     Significant Indicator NEG     Source BLD     Site PERIPHERAL     Culture Result No Growth  Note: Blood cultures are incubated for 5 days and  are monitored continuously.Positive blood cultures  are called to the RN and reported as soon as  they are identified.      Narrative:       Per Hospital Policy: Only change Specimen Src: to \"Line\" if  specified by physician order.  No " site indicated          Assessment:  Active Hospital Problems    Diagnosis   • *Abscess of calf [L02.419]   • Pyogenic arthritis of right knee joint ([facility]) [M00.9]   • DVT (deep venous thrombosis) ([facility]) [I82.409]   • Diabetes mellitus type 2 in nonobese ([facility]) [E11.9]       Plan:  Right lower extremity abscess  CT+ 15.9 cm abscess in the posterior upper calf  Status post I&D on 6/7/2019.  Purulence was noted intraoperatively down to deep posterior compartments  Operative cultures - Staphylococcus aureus  DC ceftriaxone  Continue vancomycin for now pending further susceptibility  Monitor renal function and Vanco trough  Vanco trough elevated at 25.1 on 6/8/2019  Anticipate at least a 2-week course of IV antibiotics given depth and extent of infection    Right knee effusion  Status post arthrocentesis on 6/7/2019.  Fluid was noted to be yellow and cloudy.  Analysis revealing 6116 WBCs and no crystals noted.  Not consistent with septic arthritis  Synovial fluid cultures-negative to date    Type 2 diabetes mellitus  Hemoglobin A1c 7  Control blood sugars  Blood sugar 120 today    Recent right a avulsion femur fracture with muscle tear  Complicated by DVT on Eliquis    Discussed with internal medicine/Dr. De Santiago    ADDENDUM:  Notified by pharmacy that cultures are MSSA. DC vanco and start cefazolin 2 g Q8hrs

## 2019-06-09 NOTE — CARE PLAN
Problem: Pain Management  Goal: Pain level will decrease to patient's comfort goal  Outcome: PROGRESSING AS EXPECTED    Intervention: Follow pain managment plan developed in collaboration with patient and Interdisciplinary Team  Patient tolerating current pain control regimen; PRN medications administered as ordered with relief to patients comfort goal--see MAR.

## 2019-06-09 NOTE — PROGRESS NOTES
Hospital Medicine Daily Progress Note    Date of Service  6/9/2019    Chief Complaint  Right leg pain, swelling, redness    Hospital Course   Mr. Pérez is a 53-year-old male who presented to the emergency department on 6/6/2019 with complaints of worsening right leg pain, swelling, and erythema. Of note, he was diagnosed with a blood clot approximately 2 months ago and has been maintained on Eliquis in addition to being in a hinged knee brace due to femur fracture. He was advised by his home health RN to come into the hospital for evaluation of his leg.  Ultrasound of the affected leg showed a calf mass most consistent with deep abscess so the patient was placed on IV ceftriaxone and vancomycin, and orthopedic surgery was consulted.  A CT was done and showed a 15.9 cm abscess in the posterior upper calf with associated moderate right knee effusion and synovial hyperenhancement consistent with septic arthritis. On 6/7/19 he was taken for I&D of the right leg by Dr. Harris. Synovial fluid was sent for analysis and cultures are pending. Infectious disease was also consulted.       Interval Problem Update  Infected hematoma-hemovac drain with 110 cc of output in the last 24 hours. Patient remains afebrile, percocet helps with pain, but would like another non narcotic pain medication. Tolerating Iv abx's without issue.     Consultants/Specialty  Orthopedic surgery  Infectious disease    Code Status  Full code    Disposition  Clinical for now.  Recently discharged from [facility] with outpatient physical therapy and home health.  Adamantly refusing to return to [facility] if SNF is ultimately recommended.     Review of Systems  Review of Systems   Constitutional: Negative for chills and fever.   HENT: Negative for hearing loss.    Respiratory: Negative for shortness of breath.    Cardiovascular: Positive for leg swelling (unchanged). Negative for palpitations.   Gastrointestinal: Negative for abdominal pain.      Genitourinary: Negative for frequency and urgency.   Musculoskeletal: Positive for joint pain (right knee, severe).        Mild pain today   Skin: Negative for rash.   Neurological: Positive for tingling (BLE numbness at baseline, more pronounced in his feet) and weakness (very slow progress). Negative for sensory change.   Psychiatric/Behavioral: Negative for depression.   All other systems reviewed and are negative.     Physical Exam  Temp:  [36.1 °C (96.9 °F)-36.7 °C (98 °F)] 36.1 °C (96.9 °F)  Pulse:  [80-98] 94  Resp:  [15-18] 15  BP: (108-153)/(53-83) 108/53  SpO2:  [92 %-100 %] 98 %    Physical Exam   Constitutional: He is oriented to person, place, and time. He appears well-developed and well-nourished. He is active and cooperative. He appears ill (chronically ill-appearing). Nasal cannula in place.   HENT:   Head: Normocephalic and atraumatic.   Eyes: Pupils are equal, round, and reactive to light. Conjunctivae and EOM are normal. No scleral icterus.   Neck: Normal range of motion. Neck supple.   Cardiovascular: Normal rate, regular rhythm, normal heart sounds and intact distal pulses.  Exam reveals no gallop.    Pulmonary/Chest: Effort normal. No accessory muscle usage or stridor. He has no decreased breath sounds. He has no wheezes. He has no rhonchi. He has no rales.   Abdominal: Soft. Bowel sounds are normal. He exhibits no abdominal bruit. There is no tenderness. There is no rigidity.   Musculoskeletal: Normal range of motion. He exhibits edema (RLE) and tenderness.   Wrapped in gauze with hemovac in place, moderate output with serosanguinous fluid   Neurological: He is alert and oriented to person, place, and time. No cranial nerve deficit. GCS eye subscore is 4. GCS verbal subscore is 5. GCS motor subscore is 6.   Skin: Skin is warm and dry. He is not diaphoretic. No erythema. No pallor.        Psychiatric: He has a normal mood and affect. His speech is normal. Cognition and memory are normal.    Nursing note and vitals reviewed.    Fluids    Intake/Output Summary (Last 24 hours) at 06/09/19 1223  Last data filed at 06/09/19 0630   Gross per 24 hour   Intake             1320 ml   Output             3525 ml   Net            -2205 ml     Laboratory  Recent Labs      06/07/19   0308  06/08/19   0525  06/09/19   0313   WBC  7.7  6.6  5.7   RBC  3.01*  2.64*  2.71*   HEMOGLOBIN  8.3*  7.4*  7.5*   HEMATOCRIT  27.8*  23.9*  24.2*   MCV  92.4  90.5  89.3   MCH  27.6  28.0  27.7   MCHC  29.9*  31.0*  31.0*   RDW  53.0*  52.0*  51.0*   PLATELETCT  294  259  247   MPV  9.1  9.2  9.0     Recent Labs      06/07/19   0308 06/08/19 0525 06/09/19   0313   SODIUM  135  133*  134*   POTASSIUM  3.8  4.1  4.1   CHLORIDE  106  105  105   CO2  22  21  23   GLUCOSE  149*  148*  136*   BUN  15  14  11   CREATININE  0.71  0.67  0.63   CALCIUM  8.1*  8.1*  8.4*     Imaging  US-EXTREMITY VENOUS LOWER UNILAT RIGHT   Final Result      CT-EXTREMITY, LOWER WITH RIGHT   Final Result      1.  A 15.9 cm abscess in the posterior upper calf as described.   2.  Associated moderate knee effusion with synovial hyperenhancement, consistent with septic arthritis.   3.  No cortical erosion/destruction to suggest osteomyelitis.   4.  No acute fracture or dislocation.      DX-FEMUR-2+ RIGHT   Final Result      Mild right hip osteoarthrosis. No acute fracture or dislocation.         Assessment/Plan  * Abscess of calf- (present on admission)   Assessment & Plan    S/p I&D POD#2, doing well, maintain hemovac for now  -Stop IV CTX  -continue IV vancomycin, staph in intra op cultures, but unclear if MRSA or not at this point  Continue pain control.      Pyogenic arthritis of right knee joint (HCC)- (present on admission)   Assessment & Plan    Currently on IV vancomycin & ceftriaxone. ID consulted.   Intraop cultures pending, no final speciation yet  -goal vanco trough 15-20, check daily, adjust PRN  Continue pain control.      DVT (deep venous  thrombosis) (HCC)- (present on admission)   Assessment & Plan    Maintained on eliquis PTA. Will resume once cleared by surgery, usually at least 5 days from the day of surgery.   DVT prophylaxis can usually be resumed within 36 hours, per Giuliano ARROYO, but will wait on their clearance to start it postop, can likely start DVT px today, will double check with ORTHO  SCD to the LLE.  -hemovac output is appropriate at this time, 110 cc in the last 24 hours     Diabetes mellitus type 2 in nonobese (HCC)- (present on admission)   Assessment & Plan    -Blood sugars remain around 130, A1c is 7.0  -hold outpatient oral hypoglycemics  Continue accuchecks ACHS with SSI as required.   Continue diabetic diet.   Hypoglycemia protocol in place if needed.  -sugars are doing well at this time     Chronic back pain- (present on admission)   Assessment & Plan    Maintained on scheduled MS Contin & prn flexeril, motrin (monitor kidney function closely), & percocet at home.   Continue MS Contin & prn percocet. Adjust as necessary.   Spoke with the bedside RN & patient about getting consistent pain intervention, including non-pharmacologic modalities.   Continue to monitor closely.   -no further adjustments needed at this time     Hypertension- (present on admission)   Assessment & Plan    SBP overnight 100s-teens.  Continue home lisinopril.  Trend SBP's per unit protocol.     Tobacco dependence- (present on admission)   Assessment & Plan    Continue to encourage the importance of cessation.   Nicotine replacement protocol in place.     Tachycardia- (present on admission)   Assessment & Plan    Improving with pain management     Normochromic normocytic anemia- (present on admission)   Assessment & Plan    Anemia workup with tomorrow's labs.   H/H stable for now.         VTE prophylaxis: SCD's. Currently holding Eliquis.  Per ortho PA Giuliano, NOAC's usually held for 5 days postop & prophylactic AC usually held for 36 hours. Will wait for  their ok to start/resume.

## 2019-06-10 PROBLEM — R00.0 TACHYCARDIA: Status: RESOLVED | Noted: 2019-06-06 | Resolved: 2019-06-10

## 2019-06-10 PROBLEM — D51.9 B12 DEFICIENCY ANEMIA: Status: ACTIVE | Noted: 2019-06-06

## 2019-06-10 LAB
ANION GAP SERPL CALC-SCNC: 8 MMOL/L (ref 0–11.9)
BACTERIA FLD AEROBE CULT: NORMAL
BACTERIA SPEC ANAEROBE CULT: NORMAL
BASOPHILS # BLD AUTO: 0.5 % (ref 0–1.8)
BASOPHILS # BLD: 0.03 K/UL (ref 0–0.12)
BUN SERPL-MCNC: 10 MG/DL (ref 8–22)
CALCIUM SERPL-MCNC: 8.5 MG/DL (ref 8.5–10.5)
CHLORIDE SERPL-SCNC: 105 MMOL/L (ref 96–112)
CO2 SERPL-SCNC: 22 MMOL/L (ref 20–33)
CREAT SERPL-MCNC: 0.66 MG/DL (ref 0.5–1.4)
EOSINOPHIL # BLD AUTO: 0.54 K/UL (ref 0–0.51)
EOSINOPHIL NFR BLD: 9.7 % (ref 0–6.9)
ERYTHROCYTE [DISTWIDTH] IN BLOOD BY AUTOMATED COUNT: 50.5 FL (ref 35.9–50)
GLUCOSE BLD-MCNC: 149 MG/DL (ref 65–99)
GLUCOSE BLD-MCNC: 158 MG/DL (ref 65–99)
GLUCOSE BLD-MCNC: 166 MG/DL (ref 65–99)
GLUCOSE BLD-MCNC: 180 MG/DL (ref 65–99)
GLUCOSE SERPL-MCNC: 154 MG/DL (ref 65–99)
GRAM STN SPEC: NORMAL
HCT VFR BLD AUTO: 24.5 % (ref 42–52)
HGB BLD-MCNC: 7.4 G/DL (ref 14–18)
IMM GRANULOCYTES # BLD AUTO: 0.01 K/UL (ref 0–0.11)
IMM GRANULOCYTES NFR BLD AUTO: 0.2 % (ref 0–0.9)
LYMPHOCYTES # BLD AUTO: 0.9 K/UL (ref 1–4.8)
LYMPHOCYTES NFR BLD: 16.2 % (ref 22–41)
MCH RBC QN AUTO: 27.3 PG (ref 27–33)
MCHC RBC AUTO-ENTMCNC: 30.2 G/DL (ref 33.7–35.3)
MCV RBC AUTO: 90.4 FL (ref 81.4–97.8)
MONOCYTES # BLD AUTO: 0.34 K/UL (ref 0–0.85)
MONOCYTES NFR BLD AUTO: 6.1 % (ref 0–13.4)
NEUTROPHILS # BLD AUTO: 3.75 K/UL (ref 1.82–7.42)
NEUTROPHILS NFR BLD: 67.3 % (ref 44–72)
NRBC # BLD AUTO: 0 K/UL
NRBC BLD-RTO: 0 /100 WBC
PLATELET # BLD AUTO: 233 K/UL (ref 164–446)
PMV BLD AUTO: 9.4 FL (ref 9–12.9)
POTASSIUM SERPL-SCNC: 4.4 MMOL/L (ref 3.6–5.5)
RBC # BLD AUTO: 2.71 M/UL (ref 4.7–6.1)
SIGNIFICANT IND 70042: NORMAL
SIGNIFICANT IND 70042: NORMAL
SITE SITE: NORMAL
SITE SITE: NORMAL
SODIUM SERPL-SCNC: 135 MMOL/L (ref 135–145)
SOURCE SOURCE: NORMAL
SOURCE SOURCE: NORMAL
WBC # BLD AUTO: 5.6 K/UL (ref 4.8–10.8)

## 2019-06-10 PROCEDURE — 36415 COLL VENOUS BLD VENIPUNCTURE: CPT

## 2019-06-10 PROCEDURE — A9270 NON-COVERED ITEM OR SERVICE: HCPCS | Performed by: PHYSICIAN ASSISTANT

## 2019-06-10 PROCEDURE — 82962 GLUCOSE BLOOD TEST: CPT

## 2019-06-10 PROCEDURE — 700102 HCHG RX REV CODE 250 W/ 637 OVERRIDE(OP): Performed by: HOSPITALIST

## 2019-06-10 PROCEDURE — 700111 HCHG RX REV CODE 636 W/ 250 OVERRIDE (IP): Performed by: NURSE PRACTITIONER

## 2019-06-10 PROCEDURE — 700111 HCHG RX REV CODE 636 W/ 250 OVERRIDE (IP): Performed by: INTERNAL MEDICINE

## 2019-06-10 PROCEDURE — 700102 HCHG RX REV CODE 250 W/ 637 OVERRIDE(OP): Performed by: PHYSICIAN ASSISTANT

## 2019-06-10 PROCEDURE — 85025 COMPLETE CBC W/AUTO DIFF WBC: CPT

## 2019-06-10 PROCEDURE — 99232 SBSQ HOSP IP/OBS MODERATE 35: CPT | Performed by: INTERNAL MEDICINE

## 2019-06-10 PROCEDURE — A9270 NON-COVERED ITEM OR SERVICE: HCPCS | Performed by: INTERNAL MEDICINE

## 2019-06-10 PROCEDURE — 80048 BASIC METABOLIC PNL TOTAL CA: CPT

## 2019-06-10 PROCEDURE — 700102 HCHG RX REV CODE 250 W/ 637 OVERRIDE(OP): Performed by: INTERNAL MEDICINE

## 2019-06-10 PROCEDURE — A9270 NON-COVERED ITEM OR SERVICE: HCPCS | Performed by: HOSPITALIST

## 2019-06-10 PROCEDURE — 770006 HCHG ROOM/CARE - MED/SURG/GYN SEMI*

## 2019-06-10 RX ORDER — CYANOCOBALAMIN 1000 UG/ML
1000 INJECTION, SOLUTION INTRAMUSCULAR; SUBCUTANEOUS
Status: DISCONTINUED | OUTPATIENT
Start: 2019-06-10 | End: 2019-06-13 | Stop reason: HOSPADM

## 2019-06-10 RX ORDER — OXYCODONE HYDROCHLORIDE 5 MG/1
5-10 TABLET ORAL
Status: DISCONTINUED | OUTPATIENT
Start: 2019-06-10 | End: 2019-06-13 | Stop reason: HOSPADM

## 2019-06-10 RX ORDER — CHOLECALCIFEROL (VITAMIN D3) 125 MCG
1000 CAPSULE ORAL DAILY
Status: DISCONTINUED | OUTPATIENT
Start: 2019-06-11 | End: 2019-06-13 | Stop reason: HOSPADM

## 2019-06-10 RX ORDER — CYCLOBENZAPRINE HCL 10 MG
10 TABLET ORAL 3 TIMES DAILY PRN
Status: DISCONTINUED | OUTPATIENT
Start: 2019-06-10 | End: 2019-06-13 | Stop reason: HOSPADM

## 2019-06-10 RX ADMIN — CEFAZOLIN SODIUM 2 G: 2 INJECTION, SOLUTION INTRAVENOUS at 12:51

## 2019-06-10 RX ADMIN — OXYCODONE HYDROCHLORIDE AND ACETAMINOPHEN 1 TABLET: 10; 325 TABLET ORAL at 00:30

## 2019-06-10 RX ADMIN — CYCLOBENZAPRINE HYDROCHLORIDE 10 MG: 10 TABLET, FILM COATED ORAL at 22:07

## 2019-06-10 RX ADMIN — OXYCODONE HYDROCHLORIDE 10 MG: 5 TABLET ORAL at 20:38

## 2019-06-10 RX ADMIN — OXYCODONE HYDROCHLORIDE 10 MG: 5 TABLET ORAL at 17:27

## 2019-06-10 RX ADMIN — SENNOSIDES,DOCUSATE SODIUM 2 TABLET: 8.6; 5 TABLET, FILM COATED ORAL at 17:26

## 2019-06-10 RX ADMIN — NICOTINE 21 MG: 21 PATCH, EXTENDED RELEASE TRANSDERMAL at 05:10

## 2019-06-10 RX ADMIN — OXYCODONE HYDROCHLORIDE AND ACETAMINOPHEN 1 TABLET: 10; 325 TABLET ORAL at 05:09

## 2019-06-10 RX ADMIN — CEFAZOLIN SODIUM 2 G: 2 INJECTION, SOLUTION INTRAVENOUS at 05:10

## 2019-06-10 RX ADMIN — GABAPENTIN 600 MG: 300 CAPSULE ORAL at 05:09

## 2019-06-10 RX ADMIN — CEFAZOLIN SODIUM 2 G: 2 INJECTION, SOLUTION INTRAVENOUS at 22:06

## 2019-06-10 RX ADMIN — LISINOPRIL 20 MG: 20 TABLET ORAL at 06:01

## 2019-06-10 RX ADMIN — ACETAMINOPHEN 1000 MG: 500 TABLET ORAL at 17:26

## 2019-06-10 RX ADMIN — INSULIN HUMAN 2 UNITS: 100 INJECTION, SOLUTION PARENTERAL at 00:34

## 2019-06-10 RX ADMIN — MORPHINE SULFATE 15 MG: 15 TABLET, EXTENDED RELEASE ORAL at 17:26

## 2019-06-10 RX ADMIN — INSULIN HUMAN 2 UNITS: 100 INJECTION, SOLUTION PARENTERAL at 14:18

## 2019-06-10 RX ADMIN — ACETAMINOPHEN 1000 MG: 500 TABLET ORAL at 11:46

## 2019-06-10 RX ADMIN — DIPHENHYDRAMINE HCL 25 MG: 25 TABLET ORAL at 06:01

## 2019-06-10 RX ADMIN — HYDROXYZINE HYDROCHLORIDE 10 MG: 10 TABLET, FILM COATED ORAL at 12:51

## 2019-06-10 RX ADMIN — INSULIN HUMAN 2 UNITS: 100 INJECTION, SOLUTION PARENTERAL at 06:10

## 2019-06-10 RX ADMIN — MAGNESIUM 64 MG (MAGNESIUM CHLORIDE) TABLET,DELAYED RELEASE 64 MG: at 06:01

## 2019-06-10 RX ADMIN — GABAPENTIN 600 MG: 300 CAPSULE ORAL at 11:46

## 2019-06-10 RX ADMIN — MAGNESIUM 64 MG (MAGNESIUM CHLORIDE) TABLET,DELAYED RELEASE 64 MG: at 17:28

## 2019-06-10 RX ADMIN — OXYCODONE HYDROCHLORIDE 10 MG: 5 TABLET ORAL at 12:50

## 2019-06-10 RX ADMIN — CYANOCOBALAMIN 1000 MCG: 1000 INJECTION INTRAMUSCULAR; SUBCUTANEOUS at 20:38

## 2019-06-10 RX ADMIN — OXYCODONE HYDROCHLORIDE AND ACETAMINOPHEN 1 TABLET: 10; 325 TABLET ORAL at 09:14

## 2019-06-10 RX ADMIN — MORPHINE SULFATE 15 MG: 15 TABLET, EXTENDED RELEASE ORAL at 06:01

## 2019-06-10 RX ADMIN — GABAPENTIN 600 MG: 300 CAPSULE ORAL at 17:26

## 2019-06-10 RX ADMIN — CYCLOBENZAPRINE HYDROCHLORIDE 10 MG: 10 TABLET, FILM COATED ORAL at 11:46

## 2019-06-10 ASSESSMENT — ENCOUNTER SYMPTOMS
VOMITING: 0
FOCAL WEAKNESS: 0
ABDOMINAL PAIN: 0
SPEECH CHANGE: 0
SENSORY CHANGE: 0
WHEEZING: 0
INSOMNIA: 0
WEAKNESS: 0
COUGH: 0
NERVOUS/ANXIOUS: 0
PALPITATIONS: 0
HEADACHES: 0
DIARRHEA: 0
MYALGIAS: 1
SHORTNESS OF BREATH: 0
FEVER: 0
BRUISES/BLEEDS EASILY: 0
NAUSEA: 0
DEPRESSION: 0
CONSTIPATION: 0
TINGLING: 1
CHILLS: 0
DIZZINESS: 0

## 2019-06-10 NOTE — CARE PLAN
Problem: Infection  Goal: Will remain free from infection    Intervention: Assess signs and symptoms of infection  S/S of infection educated with pt.       Problem: Mobility  Goal: Risk for activity intolerance will decrease    Intervention: Assess and monitor signs of activity intolerance  Pt able to ambulate with one assist and crutches

## 2019-06-10 NOTE — PROGRESS NOTES
"   Orthopaedic Progress Note    Interval changes:  Patient doing well  HV in place -110cc output over last 24hours  Hold blood thinners for 72 hours    ROS - Patient denies any new issues.  Pain well controlled.    /57   Pulse 70   Temp 36.1 °C (96.9 °F) (Temporal)   Resp 16   Ht 1.88 m (6' 2.02\")   Wt 107.9 kg (237 lb 14 oz)   SpO2 98%       Patient seen and examined  No acute distress  Breathing non labored  RRR  RLE surgical dressing is clean, dry, and intact. HV in place with sanguinous exudate noted.  Patient clearly fires tibialis anterior, EHL, and gastrocnemius/soleus. Sensation is intact to light touch throughout superficial peroneal, deep peroneal, tibial, saphenous, and sural nerve distributions. Strong and palpable 2+ dorsalis pedis and posterior tibial pulses with capillary refill less than 2 seconds. No lower leg tenderness or discomfort.       Recent Labs      06/07/19   0308  06/08/19   0525  06/09/19   0313   WBC  7.7  6.6  5.7   RBC  3.01*  2.64*  2.71*   HEMOGLOBIN  8.3*  7.4*  7.5*   HEMATOCRIT  27.8*  23.9*  24.2*   MCV  92.4  90.5  89.3   MCH  27.6  28.0  27.7   MCHC  29.9*  31.0*  31.0*   RDW  53.0*  52.0*  51.0*   PLATELETCT  294  259  247   MPV  9.1  9.2  9.0       Active Hospital Problems    Diagnosis   • Abscess of calf [L02.419]     Priority: High   • Pyogenic arthritis of right knee joint ([facility]) [M00.9]     Priority: High   • DVT (deep venous thrombosis) ([facility]) [I82.409]     Priority: Medium   • Diabetes mellitus type 2 in nonobese ([facility]) [E11.9]     Priority: Medium   • Hypertension [I10]     Priority: Low   • Chronic back pain [M54.9, G89.29]     Priority: Low   • Normochromic normocytic anemia [D64.9]     Priority: Low   • Tachycardia [R00.0]     Priority: Low   • Tobacco dependence [F17.200]     Priority: Low       Assessment/Plan:  HV in place until output less than 30 cc in 24 hours  POD#2 S/P:  1.  Right knee arthrocentesis.  2.  Right leg abscess incision and " drainage  Wt bearing status - WBAT  Wound care/Drains - HV in place until output less than 30 cc in 24 hours   Future Procedures - none planned   Sutures/Staples out- 10-14 days post operatively  PT/OT-initiated  Antibiotics: Ancef 2g IV Q8  DVT Prophylaxis- TEDS/SCDs/Foot pumps  [place]-none  Case Coordination for Discharge Planning - Disposition pending abx needs

## 2019-06-10 NOTE — CARE PLAN
Problem: Pain Management  Goal: Pain level will decrease to patient's comfort goal  Outcome: PROGRESSING AS EXPECTED  Patient reports pain on RLE, medicated per MAR with some relief.  Encouraged elevation, offered ice pack, encouraged rest and sleep.

## 2019-06-10 NOTE — PROGRESS NOTES
Hospital Medicine Daily Progress Note    Date of Service  6/10/2019    Chief Complaint  Right leg pain, swelling, redness    Hospital Course   Mr. Pérez is a 53-year-old male who presented to the emergency department on 6/6/2019 with complaints of worsening right leg pain, swelling, and erythema. Of note, he was diagnosed with a DVT approximately 2 months ago and has been maintained on Eliquis in addition to being in a hinged knee brace due to femur fracture. He was advised by his home health RN to come into the hospital for evaluation of his leg.  Ultrasound of the affected leg showed a calf mass most consistent with deep abscess so the patient was placed on IV ceftriaxone and vancomycin, and orthopedic surgery was consulted.  A CT was done and showed a 15.9 cm abscess in the posterior upper calf with associated moderate right knee effusion and synovial hyperenhancement consistent with septic arthritis, though fluid analysis was no consistent with septic arthritis. On 6/7/19 he was taken for I&D and vac placement of the right leg by Dr. Harris. Infectious disease was consulted. Intraoperative cultures were positive for MSSA so antibiotics were changed to IV cefazolin on 6/9/19 with anticipation of changing to a 2 week course of oral augmentin at discharge (estimated end date 6/21/19 but may be extended depending on clinical course).        Interval Problem Update  Pain improved with gabapentin. Vac output = 65 overnight. Keeping vac until drainage < 30cc/24h, per ortho recs. Still having intermittent sharp shooting pain in his right leg. No other issues or problems overnight.     H/H stable this morning, no leukocytosis. BMP unremarkable. Blood cultures done 6/6 have NGTD.    -181.     Afebrile overnight, HR 80s, BP teens-130, O2 saturations 96-97% on room air.     WBAT RLE.   Sutures/staples out 10-14 days postop.   Needs ID clinic follow up in 1-2 weeks.    Consultants/Specialty  Orthopedic  surgery  Infectious disease    Code Status  Full code    Disposition  Cleared for discharge home per ID. Still waiting on hemovac to be removed but drainage is currently too high for that. Will need updated PT/OT prior to discharge.     Review of Systems  Review of Systems   Constitutional: Negative for chills, fever and malaise/fatigue.   Respiratory: Negative for cough, shortness of breath and wheezing.    Cardiovascular: Positive for leg swelling (RLE, improving slowly). Negative for chest pain and palpitations.   Gastrointestinal: Negative for abdominal pain, constipation, diarrhea, nausea and vomiting.   Genitourinary: Negative for dysuria, frequency, hematuria and urgency.   Musculoskeletal: Positive for joint pain (right knee, severe).        Sharp shooting pains down his right leg, intermittent   Neurological: Positive for tingling (BLE numbness at baseline, more pronounced in his feet). Negative for dizziness, sensory change, speech change, focal weakness, weakness and headaches.   Endo/Heme/Allergies: Does not bruise/bleed easily.   Psychiatric/Behavioral: Negative for depression. The patient is not nervous/anxious and does not have insomnia.    All other systems reviewed and are negative.     Physical Exam  Temp:  [36 °C (96.8 °F)-37.1 °C (98.8 °F)] 36 °C (96.8 °F)  Pulse:  [82-98] 82  Resp:  [18-20] 18  BP: (104-130)/(57-72) 104/57  SpO2:  [96 %-97 %] 97 %    Physical Exam   Constitutional: He is oriented to person, place, and time. He appears well-developed and well-nourished. He is active and cooperative. He appears ill (chronically ill-appearing). No distress.   HENT:   Head: Normocephalic and atraumatic.   Eyes: Pupils are equal, round, and reactive to light. Conjunctivae are normal. Right eye exhibits no discharge. Left eye exhibits no discharge. No scleral icterus.   Neck: Normal range of motion. Neck supple.   Cardiovascular: Normal rate, regular rhythm, normal heart sounds and intact distal  pulses.  Exam reveals no gallop and no friction rub.    No murmur heard.  Pulmonary/Chest: Effort normal. No accessory muscle usage or stridor. No respiratory distress. He has no decreased breath sounds. He has no wheezes. He has no rhonchi. He has no rales.   Abdominal: Soft. He exhibits no distension and no abdominal bruit. Bowel sounds are increased. There is no tenderness. There is no rigidity and no guarding.   Musculoskeletal: He exhibits edema (RLE).        Right knee: He exhibits decreased range of motion and swelling.   Hemovac drain present on RLE, small-mod amount of serosang drainage.   Neurological: He is alert and oriented to person, place, and time. GCS eye subscore is 4. GCS verbal subscore is 5. GCS motor subscore is 6.   Skin: Skin is warm and dry. He is not diaphoretic. No erythema. No pallor.        Psychiatric: He has a normal mood and affect. His speech is normal and behavior is normal. Judgment and thought content normal. Cognition and memory are normal.   Nursing note and vitals reviewed.    Fluids    Intake/Output Summary (Last 24 hours) at 06/10/19 1743  Last data filed at 06/10/19 1251   Gross per 24 hour   Intake              340 ml   Output             2015 ml   Net            -1675 ml     Laboratory  Recent Labs      06/08/19   0525  06/09/19   0313  06/10/19   0354   WBC  6.6  5.7  5.6   RBC  2.64*  2.71*  2.71*   HEMOGLOBIN  7.4*  7.5*  7.4*   HEMATOCRIT  23.9*  24.2*  24.5*   MCV  90.5  89.3  90.4   MCH  28.0  27.7  27.3   MCHC  31.0*  31.0*  30.2*   RDW  52.0*  51.0*  50.5*   PLATELETCT  259  247  233   MPV  9.2  9.0  9.4     Recent Labs      06/08/19   0525  06/09/19   0313  06/10/19   0354   SODIUM  133*  134*  135   POTASSIUM  4.1  4.1  4.4   CHLORIDE  105  105  105   CO2  21  23  22   GLUCOSE  148*  136*  154*   BUN  14  11  10   CREATININE  0.67  0.63  0.66   CALCIUM  8.1*  8.4*  8.5     Imaging  US-EXTREMITY VENOUS LOWER UNILAT RIGHT   Final Result      CT-EXTREMITY, LOWER  WITH RIGHT   Final Result      1.  A 15.9 cm abscess in the posterior upper calf as described.   2.  Associated moderate knee effusion with synovial hyperenhancement, consistent with septic arthritis.   3.  No cortical erosion/destruction to suggest osteomyelitis.   4.  No acute fracture or dislocation.      DX-FEMUR-2+ RIGHT   Final Result      Mild right hip osteoarthrosis. No acute fracture or dislocation.         Assessment/Plan  * Abscess of calf- (present on admission)   Assessment & Plan    S/p I&D 6/7 with hemovac placement.   Cultures + MSSA. Covering with IV ancef while inpatient. Will transition to PO augmentin at discharge.   Ortho planning for hemovac d/c once drainage < 30 cc/24h.      Pyogenic arthritis of right knee joint (HCC)- (present on admission)   Assessment & Plan    Intraop cultures pending + MSSA. Continuing IV ancef while inpatient. Anticipate transitioning to PO augmentin at discharge, end date tentatively 6/21 but may be extended depending on clinical course).  Continue pain control.      DVT (deep venous thrombosis) ([facility])- (present on admission)   Assessment & Plan    Maintained on eliquis PTA.   Waiting on official surgical clearance from surgery on starting any kind of AC.   Continue postop SCD to LLE.  Needs frequent mobilization.      Diabetes mellitus type 2 in nonobese (HCC)- (present on admission)   Assessment & Plan    -181 overnight.   Holding home glipizide & metformin for now.   Continue accuchecks ACHS with SSI as required.   Continue diabetic diet.   Hypoglycemia protocol in place if needed.     Chronic back pain- (present on admission)   Assessment & Plan    Maintained on scheduled MS Contin & prn flexeril, motrin (monitor kidney function closely), & percocet at home.   Continue MS Contin & prn percocet. Adjust as necessary.   Nursing to also utilize non-pharmacologic modalities.   Continue to monitor closely.      Hypertension- (present on admission)   Assessment &  Plan    SBP overnight teens-130s.  Continue home lisinopril.  Trend SBP's per unit protocol.     Tobacco dependence- (present on admission)   Assessment & Plan    Continue to encourage the importance of cessation.   Nicotine replacement protocol in place.     B12 deficiency anemia- (present on admission)   Assessment & Plan    Anemia workup done 6/8 showed B12 deficiency. Ordered IM B12 x 1 today to be followed by oral B12 supplementation starting tomorrow.   H/H with 3+ gram decrease over the last 4-5 days.   Continue to trend H/H.        VTE prophylaxis: SCD's. Waiting on official ok by orthopedic surgery to restart AC.

## 2019-06-10 NOTE — CARE PLAN
Problem: Communication  Goal: The ability to communicate needs accurately and effectively will improve  Outcome: PROGRESSING AS EXPECTED  Discussed POC with patient Updated white board Calls appropriately  Call light within reach

## 2019-06-10 NOTE — PROGRESS NOTES
Infectious Disease Progress Note    Author: Juan Cronin M.D. Date & Time of service: 6/10/2019  8:19 AM    Chief Complaint:  Follow-up for right lower extremity abscess and right knee effusion    Interval History:  53 y.o. man with a history of type 2 diabetes mellitus complicated by diabetic peripheral neuropathy, recent hamstring tear with avulsion fracture of the distal right femur complicated by a DVT on Eliquis and hypertension admitted 2019 for worsening right calf pain and swelling.  He was found to have a right knee effusion and right lower extremity abscess.  He is now status post arthrocentesis and IND on 2019 by Dr. Harris.  Operative cultures are growing Staphylococcus aureus.     afebrile WBC 5.7.  Patient has ongoing right lower extremity pain 5 out of 10 in severity, associated with intermittent sharp shooting pain.  Tolerating IV antibiotics without any issues.  No diarrhea.  6/10 afebrile, white count 5.6.  Feeling better, no recent new issues with antibiotics.    Labs Reviewed.    Review of Systems:  Review of Systems   Constitutional: Negative for chills and fever.   Respiratory: Negative for cough and shortness of breath.    Cardiovascular: Positive for leg swelling.   Gastrointestinal: Negative for abdominal pain, diarrhea, nausea and vomiting.   Musculoskeletal: Positive for myalgias.   Neurological: Negative for dizziness and headaches.   All other systems reviewed and are negative.  Improved    Hemodynamics:  Temp (24hrs), Av.6 °C (97.8 °F), Min:36.1 °C (96.9 °F), Max:37.1 °C (98.8 °F)  Temperature: 37.1 °C (98.8 °F)  Pulse  Av.2  Min: 70  Max: 115   Blood Pressure: 130/72       Physical Exam:  Physical Exam   Constitutional: He is oriented to person, place, and time. He appears well-developed and well-nourished. No distress.   HENT:   Head: Normocephalic and atraumatic.   Mouth/Throat: No oropharyngeal exudate.   Edentulous   Eyes: Pupils are equal, round, and  reactive to light. Conjunctivae are normal. No scleral icterus.   Neck: Neck supple.   Cardiovascular: Normal rate, regular rhythm and normal heart sounds.    Pulmonary/Chest: Effort normal. No respiratory distress. He has no wheezes.   Abdominal: There is no tenderness.   Musculoskeletal: He exhibits edema.   Right lower extremity in primary surgical dressing.+ Hemovac  Edema extending down to the dorsum of his right foot improved some   Lymphadenopathy:     He has no cervical adenopathy.   Neurological: He is alert and oriented to person, place, and time.   No gross focal neuro deficit   Skin: Skin is warm and dry. He is not diaphoretic. No erythema.   Psychiatric: He has a normal mood and affect. His behavior is normal.   Nursing note and vitals reviewed.      Meds:    Current Facility-Administered Medications:   •  acetaminophen  •  diphenhydrAMINE  •  ceFAZolin  •  magnesium chloride  •  hydrOXYzine HCl  •  lisinopril  •  morphine ER  •  oxyCODONE-acetaminophen  •  gabapentin  •  senna-docusate **AND** polyethylene glycol/lytes **AND** magnesium hydroxide **AND** bisacodyl  •  labetalol  •  ondansetron  •  ondansetron  •  promethazine  •  promethazine  •  prochlorperazine  •  nicotine **AND** Nicotine Replacement Patient Education Materials **AND** nicotine polacrilex  •  insulin regular **AND** Accu-Chek Q6 if NPO **AND** NOTIFY MD and PharmD **AND** glucose **AND** dextrose 10% bolus    Labs:  Recent Labs      06/08/19   0525  06/09/19   0313  06/10/19   0354   WBC  6.6  5.7  5.6   RBC  2.64*  2.71*  2.71*   HEMOGLOBIN  7.4*  7.5*  7.4*   HEMATOCRIT  23.9*  24.2*  24.5*   MCV  90.5  89.3  90.4   MCH  28.0  27.7  27.3   RDW  52.0*  51.0*  50.5*   PLATELETCT  259  247  233   MPV  9.2  9.0  9.4   NEUTSPOLYS  67.20  61.30  67.30   LYMPHOCYTES  18.60*  22.50  16.20*   MONOCYTES  6.10  6.70  6.10   EOSINOPHILS  7.50*  8.40*  9.70*   BASOPHILS  0.30  0.90  0.50     Recent Labs      06/08/19   0525  06/09/19   0315   06/10/19   0354   SODIUM  133*  134*  135   POTASSIUM  4.1  4.1  4.4   CHLORIDE  105  105  105   CO2  21  23  22   GLUCOSE  148*  136*  154*   BUN  14  11  10     Recent Labs      06/08/19   0525  06/09/19   0313  06/10/19   0354   CREATININE  0.67  0.63  0.66       Imaging:  Ct-extremity, Lower With Right    Result Date: 6/6/2019 6/6/2019 5:49 PM HISTORY/REASON FOR EXAM:  Surgical planning for calf abscess. TECHNIQUE/EXAM DESCRIPTION AND NUMBER OF VIEWS:  CT scan of the RIGHT lower extremity with contrast, with reconstructions. Thin helical 3 mm sections were obtained from the distal femur through the proximal tibia/fibula. Sagittal and coronal multiplanar reconstructions were generated from the axial images. A total of 100 mL of Omnipaque 350 nonionic contrast was administered  IV without complication. Up to date radiation dose reduction adjustments have been utilized to meet ALARA standards for radiation dose reduction. COMPARISON: Radiograph dated 6/6/2019 FINDINGS: There is a large intramuscular abscess in the posterior upper calf, extending to the level of the knee joint. The abscess measures 15.9 cm craniocaudad and 7.8 x 6.1 cm axially. Along its inferior aspect, the abscess extends anteriorly and along the medial tibia. Marked associated muscle edema. Associated skin thickening and cellulitis. There is a moderate to large knee effusion, with marked synovial thickening/hyperenhancement, consistent with septic arthritis. No cortical erosion or destruction to suggest osteomyelitis. Mild patellofemoral osteoarthrosis. Old calcification of MCL. No acute fracture or dislocation identified. Visualized ankle joint spaces are relatively preserved. Several scattered bone islands.     1.  A 15.9 cm abscess in the posterior upper calf as described. 2.  Associated moderate knee effusion with synovial hyperenhancement, consistent with septic arthritis. 3.  No cortical erosion/destruction to suggest osteomyelitis. 4.  No  acute fracture or dislocation.    Us-extremity Venous Lower Unilat Right    Result Date: 2019   Vascular Laboratory  CONCLUSIONS  No evidence of deep venous thrombosis.  Large abscess in the upper calf, discussed on CT.  BINA PATEL  Exam Date:     2019 14:32  Room #:     Inpatient  Priority:     Stat  Ht (in):             Wt (lb):  Ordering Physician:        CARLOS GARCIA  Referring Physician:       [id number] Palmer  Sonographer:               Camila Gunderson  Study Type:                Complete Unilateral  Technical Quality:         Adequate  Age:    53    Gender:     M  MRN:    [id number]  :    1965      BSA:  Indications:     Localized swelling, mass and lump, right lower limb  CPT Codes:       04072  ICD Codes:       R22.41  History:         Right femur fracture, lower extremity pain and localized                   swelling and redness of the proximal calf. Previous DVT per                   patient, patient on eliquis. No prior duplex.  Limitations:     Edema of the proximal calf, unable to visualize the proximal                    posterior tibial and peroneal veins,  PROCEDURES:  Right lower extremity venous duplex imaging.  The following venous structures were evaluated: common femoral, profunda  femoral, greater saphenous, femoral, popliteal , peroneal and posterior  tibial veins.  Serial compression, augmentation maneuvers,  color and spectral Doppler  flow evaluations were performed.  FINDINGS:  Right lower extremity.  No evidence of deep venous thrombosis.  Complete color filling and compressibility with normal venous flow dynamics  including spontaneous flow, response to augmentation maneuvers, and  respiratory phasicity.  The peroneal and posterior tibial veins are difficult to assess for  compressibility, but flow response to augmentation is demonstrated.  Flow was evaluated in the contralateral common femoral vein and normal  venous flow dynamics including spontaneous flow,  respiratory phasic  variation and augmentation were demonstrated.  Incidental finding.  A large, avascular, heterogenous, fluid filled structure is visualized in  the left proximal calf.  Ronen Infante MD  (Electronically Signed)  Final Date:      06 June 2019                   18:45    Dx-femur-2+ Right    Result Date: 6/6/2019 6/6/2019 2:53 PM HISTORY/REASON FOR EXAM:  Pain/Deformity Following Trauma. TECHNIQUE/EXAM DESCRIPTION AND NUMBER OF VIEWS:  2 views of the RIGHT femur. COMPARISON: None. FINDINGS: BONE MINERALIZATION: Normal. JOINTS: Mild right hip osteoarthrosis. Mild patellofemoral arthrosis. No erosions. FRACTURE: None. DISLOCATION: None. SOFT TISSUES: No mass. Old calcification of the medial collateral ligament of the knee.     Mild right hip osteoarthrosis. No acute fracture or dislocation.      Micro:  Results     Procedure Component Value Units Date/Time    CULTURE WOUND W/ GRAM STAIN [[id number]]  (Abnormal)  (Susceptibility) Collected:  06/07/19 1120    Order Status:  Completed Specimen:  Wound Updated:  06/09/19 1114     Significant Indicator POS (POS)     Source WND     Site Leg Abscess     Culture Result - (A)     Gram Stain Result Many WBCs.  Few Gram positive cocci.       Culture Result Staphylococcus aureus  Light growth   (A)    Narrative:       Surgery - swabs received    Culture & Susceptibility     STAPHYLOCOCCUS AUREUS     Antibiotic Sensitivity Microscan Unit Status    Ampicillin/sulbactam Sensitive <=8/4 mcg/mL Final    Method: DANAE    Clindamycin Sensitive <=0.5 mcg/mL Final    Method: DANAE    Daptomycin Sensitive <=0.5 mcg/mL Final    Method: DANAE    Erythromycin Sensitive <=0.5 mcg/mL Final    Method: DANAE    Moxifloxacin Sensitive <=0.5 mcg/mL Final    Method: DANAE    Oxacillin Sensitive 0.5 mcg/mL Final    Method: DANAE    Penicillin Resistant >8 mcg/mL Final    Method: DANAE    Tetracycline Intermediate 8 mcg/mL Final    Method: DANAE    Trimeth/Sulfa Sensitive <=0.5/9.5 mcg/mL Final     Method: DANAE    Vancomycin Sensitive 2 mcg/mL Final    Method: DANAE                       Anaerobic Culture [[id number]] Collected:  06/07/19 1120    Order Status:  Completed Specimen:  Wound Updated:  06/09/19 1114     Significant Indicator NEG     Source WND     Site Leg Abscess     Culture Result Culture in progress.    Narrative:       Surgery - swabs received    FLUID CULTURE W/GRAM STAIN [[id number]] Collected:  06/07/19 1120    Order Status:  Completed Specimen:  Synovial Updated:  06/09/19 1106     Significant Indicator NEG     Source SYNO     Site Right Knee     Culture Result No growth at 48 hours.     Gram Stain Result Few WBCs.  No organisms seen.      Anaerobic Culture [[id number]] Collected:  06/07/19 1120    Order Status:  Completed Specimen:  Synovial Updated:  06/09/19 1106     Significant Indicator NEG     Source SYNO     Site Right Knee     Culture Result Culture in progress.    GRAM STAIN [[id number]] Collected:  06/07/19 1120    Order Status:  Completed Specimen:  Wound Updated:  06/07/19 1838     Significant Indicator .     Source WND     Site Leg Abscess     Gram Stain Result Many WBCs.  Few Gram positive cocci.      Narrative:       Surgery - swabs received    GRAM STAIN [[id number]] Collected:  06/07/19 1120    Order Status:  Completed Specimen:  Synovial Updated:  06/07/19 1253     Significant Indicator .     Source SYNO     Site Right Knee     Gram Stain Result Few WBCs.  No organisms seen.      FLUID CULTURE [[id number]] Collected:  06/07/19 1120    Order Status:  Canceled Specimen:  Other Updated:  06/07/19 1213    BLOOD CULTURE x2 [[id number]] Collected:  06/06/19 1431    Order Status:  Completed Specimen:  Blood from Peripheral Updated:  06/07/19 0836     Significant Indicator NEG     Source BLD     Site PERIPHERAL     Culture Result No Growth  Note: Blood cultures are incubated for 5 days and  are monitored continuously.Positive blood cultures  are called to the RN and reported as soon  "as  they are identified.      Narrative:       Per Hospital Policy: Only change Specimen Src: to \"Line\" if  specified by physician order.  No site indicated    BLOOD CULTURE x2 [[id number]] Collected:  06/06/19 1526    Order Status:  Completed Specimen:  Blood from Peripheral Updated:  06/07/19 0836     Significant Indicator NEG     Source BLD     Site PERIPHERAL     Culture Result No Growth  Note: Blood cultures are incubated for 5 days and  are monitored continuously.Positive blood cultures  are called to the RN and reported as soon as  they are identified.      Narrative:       Per Hospital Policy: Only change Specimen Src: to \"Line\" if  specified by physician order.  No site indicated          Assessment:  53-year-old male with diabetes, diabetic neuropathy, recent hamstring tear with avulsion fracture of the distal right femur complicated by DVT on Eliquis, hypertension, admitted on 6/6 with worsening right calf pain and swelling.  CT revealed a 15.9 cm abscess in the posterior upper calf in addition to moderate knee effusion concerning for septic arthritis.  Arthrocentesis revealed only 6000 white cells with 44% polys, with no organisms on Gram stain.  On 6/7/2019, I&D of the calf was performed with copious amounts of purulence noted.    Pertinent Diagnoses   Right lower extremity abscess  Right knee effusion  Type 2 diabetes with neuropathy  DVT on Eliquis    Plan:  Right lower extremity abscess  CT+ 15.9 cm abscess in the posterior upper calf  Status post I&D on 6/7/2019.  Purulence was noted intraoperatively down to deep posterior compartments  Operative cultures - MSSA  Blood cultures from 6/6 no growth till date.  Follow till finalized  Will discontinue IV vancomycin  Will switch to IV Ancef 2 g every 8 hours  Surgical source control has been achieved.  Anticipate 2 weeks of p.o. Augmentin 875 mg twice daily on discharge with a tentative stop date of 6/21/2019, may extend depending on clinical " course    Right knee effusion  Status post arthrocentesis on 6/7/2019.  Arthrocentesis revealed only 6000 white cells with 44% polys, with no organisms on Gram stain  Synovial fluid cultures-negative to date  These are not consistent with septic arthritis  Monitor cultures till finalized    Type 2 diabetes mellitus  Hemoglobin A1c 7  Will adversely affect wound healing and resolution of infection    Recent right a avulsion femur fracture with muscle tear complicated by DVT on Eliquis  Management per primary team    Discussed with internal medicine/Dr. De Santiago    ID clinic follow-up in 1 to 2 weeks    Okay for discharge from ID standpoint    ID will follow.  Please call with questions.

## 2019-06-10 NOTE — PROGRESS NOTES
"   Orthopaedic Progress Note    Interval changes:  Patient doing well  HV in place -105cc output over last 24hours    ROS - Patient denies any new issues.  Pain well controlled.    /67   Pulse 98   Temp 36 °C (96.8 °F) (Temporal)   Resp 18   Ht 1.88 m (6' 2.02\")   Wt 107.9 kg (237 lb 14 oz)   SpO2 96%       Patient seen and examined  No acute distress  Breathing non labored  RRR  RLE surgical dressing is clean, dry, and intact. HV in place with sanguinous exudate noted.  Patient clearly fires tibialis anterior, EHL, and gastrocnemius/soleus. Sensation is intact to light touch throughout superficial peroneal, deep peroneal, tibial, saphenous, and sural nerve distributions. Strong and palpable 2+ dorsalis pedis and posterior tibial pulses with capillary refill less than 2 seconds. No lower leg tenderness or discomfort.       Recent Labs      06/08/19   0525  06/09/19   0313  06/10/19   0354   WBC  6.6  5.7  5.6   RBC  2.64*  2.71*  2.71*   HEMOGLOBIN  7.4*  7.5*  7.4*   HEMATOCRIT  23.9*  24.2*  24.5*   MCV  90.5  89.3  90.4   MCH  28.0  27.7  27.3   MCHC  31.0*  31.0*  30.2*   RDW  52.0*  51.0*  50.5*   PLATELETCT  259  247  233   MPV  9.2  9.0  9.4       Active Hospital Problems    Diagnosis   • Abscess of calf [L02.419]     Priority: High   • Pyogenic arthritis of right knee joint ([facility]) [M00.9]     Priority: High   • DVT (deep venous thrombosis) ([facility]) [I82.409]     Priority: Medium   • Diabetes mellitus type 2 in nonobese ([facility]) [E11.9]     Priority: Medium   • Hypertension [I10]     Priority: Low   • Chronic back pain [M54.9, G89.29]     Priority: Low   • Normochromic normocytic anemia [D64.9]     Priority: Low   • Tachycardia [R00.0]     Priority: Low   • Tobacco dependence [F17.200]     Priority: Low       Assessment/Plan:  HV in place until output less than 30 cc in 24 hours  POD#3 S/P:  1.  Right knee arthrocentesis.  2.  Right leg abscess incision and drainage  Wt bearing status - WBAT  Wound " care/Drains - HV in place until output less than 30 cc in 24 hours   Future Procedures - none planned   Sutures/Staples out- 10-14 days post operatively  PT/OT-initiated  Antibiotics: Ancef 2g IV Q8  DVT Prophylaxis- TEDS/SCDs/Foot pumps  [place]-none  Case Coordination for Discharge Planning - Disposition pending abx needs

## 2019-06-11 PROBLEM — R53.1 WEAKNESS: Status: ACTIVE | Noted: 2019-06-11

## 2019-06-11 LAB
ANISOCYTOSIS BLD QL SMEAR: ABNORMAL
BACTERIA BLD CULT: NORMAL
BACTERIA BLD CULT: NORMAL
BASOPHILS # BLD AUTO: 1 % (ref 0–1.8)
BASOPHILS # BLD: 0.05 K/UL (ref 0–0.12)
COMMENT 1642: NORMAL
EOSINOPHIL # BLD AUTO: 0.63 K/UL (ref 0–0.51)
EOSINOPHIL NFR BLD: 12.8 % (ref 0–6.9)
ERYTHROCYTE [DISTWIDTH] IN BLOOD BY AUTOMATED COUNT: 51.3 FL (ref 35.9–50)
GLUCOSE BLD-MCNC: 121 MG/DL (ref 65–99)
GLUCOSE BLD-MCNC: 131 MG/DL (ref 65–99)
GLUCOSE BLD-MCNC: 164 MG/DL (ref 65–99)
GLUCOSE BLD-MCNC: 176 MG/DL (ref 65–99)
HCT VFR BLD AUTO: 26.3 % (ref 42–52)
HGB BLD-MCNC: 7.8 G/DL (ref 14–18)
HYPOCHROMIA BLD QL SMEAR: ABNORMAL
IMM GRANULOCYTES # BLD AUTO: 0.02 K/UL (ref 0–0.11)
IMM GRANULOCYTES NFR BLD AUTO: 0.4 % (ref 0–0.9)
LYMPHOCYTES # BLD AUTO: 1.15 K/UL (ref 1–4.8)
LYMPHOCYTES NFR BLD: 23.3 % (ref 22–41)
MACROCYTES BLD QL SMEAR: ABNORMAL
MCH RBC QN AUTO: 27 PG (ref 27–33)
MCHC RBC AUTO-ENTMCNC: 29.7 G/DL (ref 33.7–35.3)
MCV RBC AUTO: 91 FL (ref 81.4–97.8)
MICROCYTES BLD QL SMEAR: ABNORMAL
MONOCYTES # BLD AUTO: 0.33 K/UL (ref 0–0.85)
MONOCYTES NFR BLD AUTO: 6.7 % (ref 0–13.4)
MORPHOLOGY BLD-IMP: NORMAL
NEUTROPHILS # BLD AUTO: 2.76 K/UL (ref 1.82–7.42)
NEUTROPHILS NFR BLD: 55.8 % (ref 44–72)
NRBC # BLD AUTO: 0 K/UL
NRBC BLD-RTO: 0 /100 WBC
PLATELET # BLD AUTO: 259 K/UL (ref 164–446)
PLATELET BLD QL SMEAR: NORMAL
PMV BLD AUTO: 9.6 FL (ref 9–12.9)
RBC # BLD AUTO: 2.89 M/UL (ref 4.7–6.1)
RBC BLD AUTO: PRESENT
SIGNIFICANT IND 70042: NORMAL
SIGNIFICANT IND 70042: NORMAL
SITE SITE: NORMAL
SITE SITE: NORMAL
SOURCE SOURCE: NORMAL
SOURCE SOURCE: NORMAL
WBC # BLD AUTO: 4.9 K/UL (ref 4.8–10.8)

## 2019-06-11 PROCEDURE — 82962 GLUCOSE BLOOD TEST: CPT | Mod: 91

## 2019-06-11 PROCEDURE — A9270 NON-COVERED ITEM OR SERVICE: HCPCS | Performed by: INTERNAL MEDICINE

## 2019-06-11 PROCEDURE — 99232 SBSQ HOSP IP/OBS MODERATE 35: CPT | Performed by: INTERNAL MEDICINE

## 2019-06-11 PROCEDURE — 700102 HCHG RX REV CODE 250 W/ 637 OVERRIDE(OP): Performed by: INTERNAL MEDICINE

## 2019-06-11 PROCEDURE — 700102 HCHG RX REV CODE 250 W/ 637 OVERRIDE(OP): Performed by: NURSE PRACTITIONER

## 2019-06-11 PROCEDURE — 770006 HCHG ROOM/CARE - MED/SURG/GYN SEMI*

## 2019-06-11 PROCEDURE — 97530 THERAPEUTIC ACTIVITIES: CPT

## 2019-06-11 PROCEDURE — 700111 HCHG RX REV CODE 636 W/ 250 OVERRIDE (IP): Performed by: INTERNAL MEDICINE

## 2019-06-11 PROCEDURE — 85025 COMPLETE CBC W/AUTO DIFF WBC: CPT

## 2019-06-11 PROCEDURE — 36415 COLL VENOUS BLD VENIPUNCTURE: CPT

## 2019-06-11 PROCEDURE — 700102 HCHG RX REV CODE 250 W/ 637 OVERRIDE(OP): Performed by: HOSPITALIST

## 2019-06-11 PROCEDURE — A9270 NON-COVERED ITEM OR SERVICE: HCPCS | Performed by: HOSPITALIST

## 2019-06-11 PROCEDURE — A9270 NON-COVERED ITEM OR SERVICE: HCPCS | Performed by: NURSE PRACTITIONER

## 2019-06-11 PROCEDURE — 97116 GAIT TRAINING THERAPY: CPT

## 2019-06-11 PROCEDURE — 99232 SBSQ HOSP IP/OBS MODERATE 35: CPT | Performed by: HOSPITALIST

## 2019-06-11 RX ADMIN — GABAPENTIN 600 MG: 300 CAPSULE ORAL at 11:53

## 2019-06-11 RX ADMIN — CYCLOBENZAPRINE HYDROCHLORIDE 10 MG: 10 TABLET, FILM COATED ORAL at 21:39

## 2019-06-11 RX ADMIN — GABAPENTIN 600 MG: 300 CAPSULE ORAL at 17:26

## 2019-06-11 RX ADMIN — ACETAMINOPHEN 1000 MG: 500 TABLET ORAL at 17:26

## 2019-06-11 RX ADMIN — OXYCODONE HYDROCHLORIDE 10 MG: 5 TABLET ORAL at 11:54

## 2019-06-11 RX ADMIN — ACETAMINOPHEN 1000 MG: 500 TABLET ORAL at 00:06

## 2019-06-11 RX ADMIN — OXYCODONE HYDROCHLORIDE 10 MG: 5 TABLET ORAL at 00:06

## 2019-06-11 RX ADMIN — INSULIN HUMAN 2 UNITS: 100 INJECTION, SOLUTION PARENTERAL at 12:58

## 2019-06-11 RX ADMIN — INSULIN HUMAN 2 UNITS: 100 INJECTION, SOLUTION PARENTERAL at 00:09

## 2019-06-11 RX ADMIN — MORPHINE SULFATE 15 MG: 15 TABLET, EXTENDED RELEASE ORAL at 17:26

## 2019-06-11 RX ADMIN — OXYCODONE HYDROCHLORIDE 10 MG: 5 TABLET ORAL at 08:47

## 2019-06-11 RX ADMIN — OXYCODONE HYDROCHLORIDE 10 MG: 5 TABLET ORAL at 04:20

## 2019-06-11 RX ADMIN — OXYCODONE HYDROCHLORIDE 10 MG: 5 TABLET ORAL at 21:30

## 2019-06-11 RX ADMIN — NICOTINE 21 MG: 21 PATCH, EXTENDED RELEASE TRANSDERMAL at 06:09

## 2019-06-11 RX ADMIN — CYANOCOBALAMIN TAB 500 MCG 1000 MCG: 500 TAB at 06:09

## 2019-06-11 RX ADMIN — INSULIN HUMAN 2 UNITS: 100 INJECTION, SOLUTION PARENTERAL at 21:37

## 2019-06-11 RX ADMIN — LISINOPRIL 20 MG: 20 TABLET ORAL at 06:09

## 2019-06-11 RX ADMIN — MAGNESIUM 64 MG (MAGNESIUM CHLORIDE) TABLET,DELAYED RELEASE 64 MG: at 17:29

## 2019-06-11 RX ADMIN — CYCLOBENZAPRINE HYDROCHLORIDE 10 MG: 10 TABLET, FILM COATED ORAL at 11:56

## 2019-06-11 RX ADMIN — CEFAZOLIN SODIUM 2 G: 2 INJECTION, SOLUTION INTRAVENOUS at 06:07

## 2019-06-11 RX ADMIN — OXYCODONE HYDROCHLORIDE 10 MG: 5 TABLET ORAL at 18:28

## 2019-06-11 RX ADMIN — OXYCODONE HYDROCHLORIDE 10 MG: 5 TABLET ORAL at 15:13

## 2019-06-11 RX ADMIN — GABAPENTIN 600 MG: 300 CAPSULE ORAL at 06:08

## 2019-06-11 RX ADMIN — MORPHINE SULFATE 15 MG: 15 TABLET, EXTENDED RELEASE ORAL at 06:09

## 2019-06-11 RX ADMIN — ACETAMINOPHEN 1000 MG: 500 TABLET ORAL at 11:53

## 2019-06-11 RX ADMIN — CEFAZOLIN SODIUM 2 G: 2 INJECTION, SOLUTION INTRAVENOUS at 21:29

## 2019-06-11 RX ADMIN — CEFAZOLIN SODIUM 2 G: 2 INJECTION, SOLUTION INTRAVENOUS at 15:13

## 2019-06-11 RX ADMIN — MAGNESIUM 64 MG (MAGNESIUM CHLORIDE) TABLET,DELAYED RELEASE 64 MG: at 06:13

## 2019-06-11 ASSESSMENT — ENCOUNTER SYMPTOMS
SENSORY CHANGE: 0
PALPITATIONS: 0
FOCAL WEAKNESS: 0
CONSTIPATION: 0
SORE THROAT: 0
BRUISES/BLEEDS EASILY: 0
COUGH: 0
FEVER: 0
HEADACHES: 0
NAUSEA: 0
VOMITING: 0
SPEECH CHANGE: 0
INSOMNIA: 0
MYALGIAS: 1
LOSS OF CONSCIOUSNESS: 0
DIARRHEA: 0
DIZZINESS: 0
EYE REDNESS: 0
ABDOMINAL PAIN: 0
WHEEZING: 0
SHORTNESS OF BREATH: 0
EYE DISCHARGE: 0
WEAKNESS: 0
DEPRESSION: 0
STRIDOR: 0
SINUS PAIN: 0
CHILLS: 0
NERVOUS/ANXIOUS: 0
SEIZURES: 0
EYE PAIN: 0

## 2019-06-11 ASSESSMENT — COGNITIVE AND FUNCTIONAL STATUS - GENERAL
STANDING UP FROM CHAIR USING ARMS: A LITTLE
SUGGESTED CMS G CODE MODIFIER MOBILITY: CJ
MOBILITY SCORE: 21
WALKING IN HOSPITAL ROOM: A LITTLE
CLIMB 3 TO 5 STEPS WITH RAILING: A LITTLE

## 2019-06-11 ASSESSMENT — GAIT ASSESSMENTS
DEVIATION: ANTALGIC;STEP TO;BRADYKINETIC;DECREASED HEEL STRIKE;OTHER (COMMENT)
DISTANCE (FEET): 50
GAIT LEVEL OF ASSIST: SUPERVISED
ASSISTIVE DEVICE: CRUTCHES

## 2019-06-11 NOTE — CARE PLAN
Problem: Nutritional:  Goal: Achieve adequate nutritional intake  Patient will consume >50% of meals   Outcome: MET Date Met: 06/11/19  PO >50% for most recent seven meals.

## 2019-06-11 NOTE — CARE PLAN
Problem: Venous Thromboembolism (VTW)/Deep Vein Thrombosis (DVT) Prevention:  Goal: Patient will participate in Venous Thrombosis (VTE)/Deep Vein Thrombosis (DVT)Prevention Measures  Outcome: PROGRESSING AS EXPECTED   06/10/19 2200   Mechanical/VTE Prophylaxis   Mechanical Prophylaxis  SCDs, Sequential Compression Device   SCDs, Sequential Compression Device On   OTHER   Risk Assessment Score 2   VTE RISK Moderate   Pharmacologic Prophylaxis Used Contraindicated per MD       Problem: Bowel/Gastric:  Goal: Normal bowel function is maintained or improved  Outcome: PROGRESSING AS EXPECTED  LBM 6/6/19. Bowel sounds normoactive in all 4 quadrants, + flatulence.  Has been refusing stool softeners for many days. Educated the importance and needs of bowel protocol.  Encouraged increased mobility and oral fluids.

## 2019-06-11 NOTE — PROGRESS NOTES
Infectious Disease Progress Note    Author: Juan Cronin M.D. Date & Time of service: 2019  7:50 AM    Chief Complaint:  Follow-up for right lower extremity abscess and right knee effusion    Interval History:  53 y.o. man with a history of type 2 diabetes mellitus complicated by diabetic peripheral neuropathy, recent hamstring tear with avulsion fracture of the distal right femur complicated by a DVT on Eliquis and hypertension admitted 2019 for worsening right calf pain and swelling.  He was found to have a right knee effusion and right lower extremity abscess.  He is now status post arthrocentesis and IND on 2019 by Dr. Harris.  Operative cultures are growing Staphylococcus aureus.     afebrile WBC 5.7.  Patient has ongoing right lower extremity pain 5 out of 10 in severity, associated with intermittent sharp shooting pain.  Tolerating IV antibiotics without any issues.  No diarrhea.  6/10 afebrile, white count 5.6.  Feeling better, no recent new issues with antibiotics.   afebrile, white count 4.9.  No new issues, tolerating antibiotics    Labs Reviewed.    Review of Systems:  Review of Systems   Constitutional: Negative for chills and fever.   Respiratory: Negative for cough and shortness of breath.    Cardiovascular: Positive for leg swelling.   Gastrointestinal: Negative for abdominal pain, diarrhea, nausea and vomiting.   Musculoskeletal: Positive for myalgias.   Neurological: Negative for dizziness and headaches.   All other systems reviewed and are negative.  Improved    Hemodynamics:  Temp (24hrs), Av.1 °C (97 °F), Min:36 °C (96.8 °F), Max:36.3 °C (97.3 °F)  Temperature: 36.3 °C (97.3 °F)  Pulse  Av.5  Min: 70  Max: 115   Blood Pressure: 110/61       Physical Exam:  Physical Exam   Constitutional: He is oriented to person, place, and time. He appears well-developed and well-nourished. No distress.   HENT:   Head: Normocephalic and atraumatic.   Mouth/Throat: No  oropharyngeal exudate.   Edentulous   Eyes: Pupils are equal, round, and reactive to light. Conjunctivae are normal. No scleral icterus.   Neck: Neck supple.   Cardiovascular: Normal rate, regular rhythm and normal heart sounds.    Pulmonary/Chest: Effort normal. No respiratory distress. He has no wheezes.   Abdominal: There is no tenderness.   Musculoskeletal: He exhibits edema.   Right lower extremity in primary surgical dressing.+ Hemovac  Edema extending down to the dorsum of his right foot improved some   Lymphadenopathy:     He has no cervical adenopathy.   Neurological: He is alert and oriented to person, place, and time.   No gross focal neuro deficit   Skin: Skin is warm and dry. He is not diaphoretic. No erythema.   Psychiatric: He has a normal mood and affect. His behavior is normal.   Nursing note and vitals reviewed.  No change compared to 6/10    Meds:    Current Facility-Administered Medications:   •  oxyCODONE immediate-release  •  cyclobenzaprine  •  cyanocobalamin  •  cyanocobalamin  •  acetaminophen  •  diphenhydrAMINE  •  ceFAZolin  •  magnesium chloride  •  hydrOXYzine HCl  •  lisinopril  •  morphine ER  •  gabapentin  •  senna-docusate **AND** polyethylene glycol/lytes **AND** magnesium hydroxide **AND** bisacodyl  •  labetalol  •  ondansetron  •  ondansetron  •  promethazine  •  promethazine  •  prochlorperazine  •  nicotine **AND** Nicotine Replacement Patient Education Materials **AND** nicotine polacrilex  •  insulin regular **AND** Accu-Chek Q6 if NPO **AND** NOTIFY MD and PharmD **AND** glucose **AND** dextrose 10% bolus    Labs:  Recent Labs      06/09/19   0313  06/10/19   0354  06/11/19   0411   WBC  5.7  5.6  4.9   RBC  2.71*  2.71*  2.89*   HEMOGLOBIN  7.5*  7.4*  7.8*   HEMATOCRIT  24.2*  24.5*  26.3*   MCV  89.3  90.4  91.0   MCH  27.7  27.3  27.0   RDW  51.0*  50.5*  51.3*   PLATELETCT  247  233  259   MPV  9.0  9.4  9.6   NEUTSPOLYS  61.30  67.30  55.80   LYMPHOCYTES  22.50   16.20*  23.30   MONOCYTES  6.70  6.10  6.70   EOSINOPHILS  8.40*  9.70*  12.80*   BASOPHILS  0.90  0.50  1.00   RBCMORPHOLO   --    --   Present     Recent Labs      06/09/19   0313  06/10/19   0354   SODIUM  134*  135   POTASSIUM  4.1  4.4   CHLORIDE  105  105   CO2  23  22   GLUCOSE  136*  154*   BUN  11  10     Recent Labs      06/09/19   0313  06/10/19   0354   CREATININE  0.63  0.66       Imaging:  Ct-extremity, Lower With Right    Result Date: 6/6/2019 6/6/2019 5:49 PM HISTORY/REASON FOR EXAM:  Surgical planning for calf abscess. TECHNIQUE/EXAM DESCRIPTION AND NUMBER OF VIEWS:  CT scan of the RIGHT lower extremity with contrast, with reconstructions. Thin helical 3 mm sections were obtained from the distal femur through the proximal tibia/fibula. Sagittal and coronal multiplanar reconstructions were generated from the axial images. A total of 100 mL of Omnipaque 350 nonionic contrast was administered  IV without complication. Up to date radiation dose reduction adjustments have been utilized to meet ALARA standards for radiation dose reduction. COMPARISON: Radiograph dated 6/6/2019 FINDINGS: There is a large intramuscular abscess in the posterior upper calf, extending to the level of the knee joint. The abscess measures 15.9 cm craniocaudad and 7.8 x 6.1 cm axially. Along its inferior aspect, the abscess extends anteriorly and along the medial tibia. Marked associated muscle edema. Associated skin thickening and cellulitis. There is a moderate to large knee effusion, with marked synovial thickening/hyperenhancement, consistent with septic arthritis. No cortical erosion or destruction to suggest osteomyelitis. Mild patellofemoral osteoarthrosis. Old calcification of MCL. No acute fracture or dislocation identified. Visualized ankle joint spaces are relatively preserved. Several scattered bone islands.     1.  A 15.9 cm abscess in the posterior upper calf as described. 2.  Associated moderate knee effusion with  synovial hyperenhancement, consistent with septic arthritis. 3.  No cortical erosion/destruction to suggest osteomyelitis. 4.  No acute fracture or dislocation.    Us-extremity Venous Lower Unilat Right    Result Date: 2019   Vascular Laboratory  CONCLUSIONS  No evidence of deep venous thrombosis.  Large abscess in the upper calf, discussed on CT.  BINA PATEL  Exam Date:     2019 14:32  Room #:     Inpatient  Priority:     Stat  Ht (in):             Wt (lb):  Ordering Physician:        CARLOS GARCIA  Referring Physician:       [id number] Palmer  Sonographer:               Camila Gunderson  Study Type:                Complete Unilateral  Technical Quality:         Adequate  Age:    53    Gender:     M  MRN:    [id number]  :    1965      BSA:  Indications:     Localized swelling, mass and lump, right lower limb  CPT Codes:       53788  ICD Codes:       R22.41  History:         Right femur fracture, lower extremity pain and localized                   swelling and redness of the proximal calf. Previous DVT per                   patient, patient on eliquis. No prior duplex.  Limitations:     Edema of the proximal calf, unable to visualize the proximal                    posterior tibial and peroneal veins,  PROCEDURES:  Right lower extremity venous duplex imaging.  The following venous structures were evaluated: common femoral, profunda  femoral, greater saphenous, femoral, popliteal , peroneal and posterior  tibial veins.  Serial compression, augmentation maneuvers,  color and spectral Doppler  flow evaluations were performed.  FINDINGS:  Right lower extremity.  No evidence of deep venous thrombosis.  Complete color filling and compressibility with normal venous flow dynamics  including spontaneous flow, response to augmentation maneuvers, and  respiratory phasicity.  The peroneal and posterior tibial veins are difficult to assess for  compressibility, but flow response to augmentation is demonstrated.   Flow was evaluated in the contralateral common femoral vein and normal  venous flow dynamics including spontaneous flow, respiratory phasic  variation and augmentation were demonstrated.  Incidental finding.  A large, avascular, heterogenous, fluid filled structure is visualized in  the left proximal calf.  Ronen Infante MD  (Electronically Signed)  Final Date:      06 June 2019                   18:45    Dx-femur-2+ Right    Result Date: 6/6/2019 6/6/2019 2:53 PM HISTORY/REASON FOR EXAM:  Pain/Deformity Following Trauma. TECHNIQUE/EXAM DESCRIPTION AND NUMBER OF VIEWS:  2 views of the RIGHT femur. COMPARISON: None. FINDINGS: BONE MINERALIZATION: Normal. JOINTS: Mild right hip osteoarthrosis. Mild patellofemoral arthrosis. No erosions. FRACTURE: None. DISLOCATION: None. SOFT TISSUES: No mass. Old calcification of the medial collateral ligament of the knee.     Mild right hip osteoarthrosis. No acute fracture or dislocation.      Micro:  Results     Procedure Component Value Units Date/Time    CULTURE WOUND W/ GRAM STAIN [[id number]]  (Abnormal)  (Susceptibility) Collected:  06/07/19 1120    Order Status:  Completed Specimen:  Wound Updated:  06/10/19 1602     Significant Indicator POS (POS)     Source WND     Site Leg Abscess     Culture Result - (A)     Gram Stain Result Many WBCs.  Few Gram positive cocci.       Culture Result Staphylococcus aureus  Light growth   (A)    Narrative:       Surgery - swabs received    Culture & Susceptibility     STAPHYLOCOCCUS AUREUS     Antibiotic Sensitivity Microscan Unit Status    Ampicillin/sulbactam Sensitive <=8/4 mcg/mL Final    Method: DNAAE    Clindamycin Sensitive <=0.5 mcg/mL Final    Method: DANAE    Daptomycin Sensitive <=0.5 mcg/mL Final    Method: DANAE    Erythromycin Sensitive <=0.5 mcg/mL Final    Method: DANAE    Moxifloxacin Sensitive <=0.5 mcg/mL Final    Method: DANAE    Oxacillin Sensitive 0.5 mcg/mL Final    Method: DANAE    Penicillin Resistant >8 mcg/mL Final     Method: DANAE    Tetracycline Intermediate 8 mcg/mL Final    Method: DANAE    Trimeth/Sulfa Sensitive <=0.5/9.5 mcg/mL Final    Method: DANAE    Vancomycin Sensitive 2 mcg/mL Final    Method: DANAE                       Anaerobic Culture [[id number]] Collected:  06/07/19 1120    Order Status:  Completed Specimen:  Wound Updated:  06/10/19 1602     Significant Indicator NEG     Source WND     Site Leg Abscess     Culture Result No Anaerobes isolated.    Narrative:       Surgery - swabs received    FLUID CULTURE W/GRAM STAIN [[id number]] Collected:  06/07/19 1120    Order Status:  Completed Specimen:  Synovial Updated:  06/10/19 1033     Significant Indicator NEG     Source SYNO     Site Right Knee     Culture Result No growth at 72 hours.     Gram Stain Result Few WBCs.  No organisms seen.      Anaerobic Culture [[id number]] Collected:  06/07/19 1120    Order Status:  Completed Specimen:  Synovial Updated:  06/10/19 1033     Significant Indicator NEG     Source SYNO     Site Right Knee     Culture Result Culture in progress.    GRAM STAIN [[id number]] Collected:  06/07/19 1120    Order Status:  Completed Specimen:  Wound Updated:  06/07/19 1838     Significant Indicator .     Source WND     Site Leg Abscess     Gram Stain Result Many WBCs.  Few Gram positive cocci.      Narrative:       Surgery - swabs received    GRAM STAIN [[id number]] Collected:  06/07/19 1120    Order Status:  Completed Specimen:  Synovial Updated:  06/07/19 1253     Significant Indicator .     Source SYNO     Site Right Knee     Gram Stain Result Few WBCs.  No organisms seen.      FLUID CULTURE [[id number]] Collected:  06/07/19 1120    Order Status:  Canceled Specimen:  Other Updated:  06/07/19 1213    BLOOD CULTURE x2 [[id number]] Collected:  06/06/19 1431    Order Status:  Completed Specimen:  Blood from Peripheral Updated:  06/07/19 0836     Significant Indicator NEG     Source BLD     Site PERIPHERAL     Culture Result No Growth  Note: Blood cultures are  "incubated for 5 days and  are monitored continuously.Positive blood cultures  are called to the RN and reported as soon as  they are identified.      Narrative:       Per Hospital Policy: Only change Specimen Src: to \"Line\" if  specified by physician order.  No site indicated    BLOOD CULTURE x2 [[id number]] Collected:  06/06/19 1526    Order Status:  Completed Specimen:  Blood from Peripheral Updated:  06/07/19 0836     Significant Indicator NEG     Source BLD     Site PERIPHERAL     Culture Result No Growth  Note: Blood cultures are incubated for 5 days and  are monitored continuously.Positive blood cultures  are called to the RN and reported as soon as  they are identified.      Narrative:       Per Hospital Policy: Only change Specimen Src: to \"Line\" if  specified by physician order.  No site indicated          Assessment:  53-year-old male with diabetes, diabetic neuropathy, recent hamstring tear with avulsion fracture of the distal right femur complicated by DVT on Eliquis, hypertension, admitted on 6/6 with worsening right calf pain and swelling.  CT revealed a 15.9 cm abscess in the posterior upper calf in addition to moderate knee effusion concerning for septic arthritis.  Arthrocentesis revealed only 6000 white cells with 44% polys, with no organisms on Gram stain.  On 6/7/2019, I&D of the calf was performed with copious amounts of purulence noted.    Pertinent Diagnoses   Right lower extremity abscess  Right knee effusion  Type 2 diabetes with neuropathy  DVT on Eliquis    Plan:  Right lower extremity abscess  CT+ 15.9 cm abscess in the posterior upper calf  Status post I&D on 6/7/2019.  Purulence was noted intraoperatively down to deep posterior compartments  Operative cultures - MSSA  Blood cultures from 6/6 no growth till date.  Follow till finalized  Continue IV Ancef 2 g every 8 hours  Surgical source control has been achieved.  Anticipate 2 weeks of p.o. Augmentin 875 mg twice daily on discharge with " a tentative stop date of 6/21/2019, may extend depending on clinical course    Right knee effusion  Status post arthrocentesis on 6/7/2019.  Arthrocentesis revealed only 6000 white cells with 44% polys, with no organisms on Gram stain  Synovial fluid cultures-negative to date  These are not consistent with septic arthritis  Monitor cultures till finalized    Type 2 diabetes mellitus  Hemoglobin A1c 7  Will adversely affect wound healing and resolution of infection    Recent right a avulsion femur fracture with muscle tear complicated by DVT on Eliquis  Management per primary team    Discussed with primary    ID clinic follow-up in 1 to 2 weeks    Okay for discharge from ID standpoint    ID will sign off.  Please call with questions.

## 2019-06-11 NOTE — PROGRESS NOTES
Hospital Medicine Daily Progress Note    Date of Service  6/11/2019    Chief Complaint  Right leg pain, swelling, redness    Hospital Course      53-year-old male with a past medical history of DVT on Eliquis admitted 6/6/2019 with right leg pain, swelling, and erythema. Ultrasound of the affected leg showed a calf mass most consistent with deep abscess so the patient was placed on IV ceftriaxone and vancomycin, and orthopedic surgery was consulted.  A CT was done and showed a 15.9 cm abscess in the posterior upper calf with associated moderate right knee effusion and synovial hyperenhancement consistent with septic arthritis, though fluid analysis was no consistent with septic arthritis. On 6/7/19 he was taken for I&D and vac placement of the right leg by Dr. Harris. Infectious disease was consulted. Intraoperative cultures were positive for MSSA so antibiotics were changed to IV cefazolin.  With anticipation of changing to a 2 week course of oral augmentin at discharge (estimated end date 6/21/19)         Interval Problem Update  Hemodynamically stable overnight   Saturating well on room air   No fevers or chills   Vac output still over 30mL. Keeping vac until drainage < 30cc/24h, per ortho recs.   Discussed with patient, patient's nurse and with multidisciplinary team during rounds including , and charge nurse.    Blood sugars 150's-160's   PT/OT recommending HH, I am ordering   WBAT RLE. Sutures/staples out 10-14 days postop.   Needs ID clinic follow up in 1-2 weeks.  I discussed with orthopedics PA [Rudy], can start A/C Thur 3/13     Consultants/Specialty  Orthopedic surgery  Infectious disease    Code Status  Full Code    Disposition  Home with HH. Still waiting on hemovac to be removed but drainage is currently too high for that.     Review of Systems  Review of Systems   Constitutional: Negative for chills, fever and malaise/fatigue.   HENT: Negative for sinus pain and sore throat.    Eyes:  Negative for pain, discharge and redness.   Respiratory: Negative for cough, shortness of breath, wheezing and stridor.    Cardiovascular: Positive for leg swelling (RLE, improving). Negative for chest pain and palpitations.   Gastrointestinal: Negative for abdominal pain, constipation, diarrhea, nausea and vomiting.   Genitourinary: Negative for dysuria, frequency, hematuria and urgency.   Musculoskeletal: Positive for joint pain (right knee).        Sharp shooting pains down his right leg, intermittent   Neurological: Negative for dizziness, sensory change, speech change, focal weakness, seizures, loss of consciousness, weakness and headaches.   Endo/Heme/Allergies: Does not bruise/bleed easily.   Psychiatric/Behavioral: Negative for depression. The patient is not nervous/anxious and does not have insomnia.       Physical Exam  Temp:  [36 °C (96.8 °F)-36.8 °C (98.3 °F)] 36.8 °C (98.3 °F)  Pulse:  [68-88] 68  Resp:  [15-18] 15  BP: (104-122)/(57-71) 104/71  SpO2:  [96 %-98 %] 96 %    Physical Exam   Constitutional: He is oriented to person, place, and time. He appears well-developed and well-nourished. He is active and cooperative. He appears ill (chronically ill-appearing). No distress.   HENT:   Head: Normocephalic and atraumatic.   Eyes: Pupils are equal, round, and reactive to light. Conjunctivae are normal. Right eye exhibits no discharge. Left eye exhibits no discharge. No scleral icterus.   Neck: Normal range of motion. Neck supple.   Cardiovascular: Normal rate, regular rhythm, normal heart sounds and intact distal pulses.  Exam reveals no gallop and no friction rub.    No murmur heard.  Pulmonary/Chest: Effort normal. No accessory muscle usage or stridor. No respiratory distress. He has no decreased breath sounds. He has no wheezes. He has no rhonchi. He has no rales.   Abdominal: Soft. He exhibits no distension and no abdominal bruit. Bowel sounds are increased. There is no tenderness. There is no  rigidity and no guarding.   Musculoskeletal: He exhibits edema (RLE).        Right knee: He exhibits decreased range of motion and swelling.   Hemovac drain on RLE    Neurological: He is alert and oriented to person, place, and time. GCS eye subscore is 4. GCS verbal subscore is 5. GCS motor subscore is 6.   Skin: Skin is warm and dry. He is not diaphoretic. No erythema. No pallor.        Psychiatric: He has a normal mood and affect. His speech is normal. Judgment normal. Cognition and memory are normal.   Nursing note and vitals reviewed.    Fluids    Intake/Output Summary (Last 24 hours) at 06/11/19 1424  Last data filed at 06/11/19 0600   Gross per 24 hour   Intake              340 ml   Output             1210 ml   Net             -870 ml     Laboratory  Recent Labs      06/09/19   0313  06/10/19   0354  06/11/19   0411   WBC  5.7  5.6  4.9   RBC  2.71*  2.71*  2.89*   HEMOGLOBIN  7.5*  7.4*  7.8*   HEMATOCRIT  24.2*  24.5*  26.3*   MCV  89.3  90.4  91.0   MCH  27.7  27.3  27.0   MCHC  31.0*  30.2*  29.7*   RDW  51.0*  50.5*  51.3*   PLATELETCT  247  233  259   MPV  9.0  9.4  9.6     Recent Labs      06/09/19   0313  06/10/19   0354   SODIUM  134*  135   POTASSIUM  4.1  4.4   CHLORIDE  105  105   CO2  23  22   GLUCOSE  136*  154*   BUN  11  10   CREATININE  0.63  0.66   CALCIUM  8.4*  8.5     Imaging  US-EXTREMITY VENOUS LOWER UNILAT RIGHT   Final Result      CT-EXTREMITY, LOWER WITH RIGHT   Final Result      1.  A 15.9 cm abscess in the posterior upper calf as described.   2.  Associated moderate knee effusion with synovial hyperenhancement, consistent with septic arthritis.   3.  No cortical erosion/destruction to suggest osteomyelitis.   4.  No acute fracture or dislocation.      DX-FEMUR-2+ RIGHT   Final Result      Mild right hip osteoarthrosis. No acute fracture or dislocation.         Assessment/Plan  * Abscess of calf- (present on admission)   Assessment & Plan    S/p I&D 6/7 with hemovac placement.    Cultures + MSSA. Covering with IV ancef while inpatient. Will transition to PO augmentin [X2 Weeks] at discharge.   Ortho planning for hemovac d/c once drainage < 30 cc/24h.       Pyogenic arthritis of right knee joint ([facility])- (present on admission)   Assessment & Plan    Intraop cultures + MSSA. Continuing IV ancef while inpatient. Anticipate transitioning to PO augmentin at discharge, end date tentatively 6/21 but may be extended depending on clinical course).  Continue pain control.        Weakness- (present on admission)   Assessment & Plan    Multifactorial, age, recent infection, diabetes  Physical and occupational therapy evaluation      DVT (deep venous thrombosis) ([facility])- (present on admission)   Assessment & Plan    Was on eliquis outpt.   Waiting on official surgical clearance from surgery on starting any kind of AC.   Continue postop SCD to LLE.  Encouraging mobilization.        Diabetes mellitus type 2 in nonobese ([facility])- (present on admission)   Assessment & Plan    Holding home glipizide & metformin for now.  Blood sugars 150's-160's     Continue accuchecks ACHS with SSI as required.   Continue diabetic diet.   Hypoglycemia protocol in place if needed.     Chronic back pain- (present on admission)   Assessment & Plan    Maintained on scheduled MS Contin & prn flexeril, motrin (monitor kidney function closely), & percocet at home.   Continue MS Contin & prn percocet. Adjust as necessary.   Nursing to also utilize non-pharmacologic modalities.      Hypertension- (present on admission)   Assessment & Plan    Lisinopril.      Tobacco dependence- (present on admission)   Assessment & Plan    Continue to encourage the importance of cessation.   Nicotine replacement protocol in place.      B12 deficiency anemia- (present on admission)   Assessment & Plan    S/p IM B12   Oral B12 supplementation         VTE prophylaxis: SCD's. Lovenox. Waiting on OK by orthopedic surgery to restart AC.  I discussed with  orthopedics, can start A/C Thur 3/13

## 2019-06-11 NOTE — THERAPY
"Physical Therapy Treatment completed.   Bed Mobility:  Supine to Sit: Supervised  Transfers: Sit to Stand: Supervised  Gait: Level Of Assist: Supervised with Crutches       Plan of Care: Will benefit from Physical Therapy 5 times per week  Discharge Recommendations: Equipment: No Equipment Needed. Post-acute therapy Recommend home health transitional care services for continued physical therapy services.    See \"Rehab Therapy-Acute\" Patient Summary Report for complete documentation.     Pt progressing well w/ therapy. Pt is able to perform all mobility at a SPV level. He demonstrates a steady gait pattern and was able to perform a flight of stairs. Pt does have significant weakness in his R LE and ROM deficits. Pt educated on an HEP. He is at a level in which he can DC home w/ HH.  "

## 2019-06-11 NOTE — CARE PLAN
Problem: Safety  Goal: Will remain free from falls    Intervention: Implement fall precautions  Safety precautions in place. Call light within reach. Bed is low and in locked position. Hourly rounding in place.

## 2019-06-12 LAB
BACTERIA SPEC ANAEROBE CULT: NORMAL
BASOPHILS # BLD AUTO: 0.7 % (ref 0–1.8)
BASOPHILS # BLD: 0.03 K/UL (ref 0–0.12)
EOSINOPHIL # BLD AUTO: 0.47 K/UL (ref 0–0.51)
EOSINOPHIL NFR BLD: 10.8 % (ref 0–6.9)
ERYTHROCYTE [DISTWIDTH] IN BLOOD BY AUTOMATED COUNT: 50.5 FL (ref 35.9–50)
GLUCOSE BLD-MCNC: 125 MG/DL (ref 65–99)
GLUCOSE BLD-MCNC: 151 MG/DL (ref 65–99)
GLUCOSE BLD-MCNC: 155 MG/DL (ref 65–99)
GLUCOSE BLD-MCNC: 200 MG/DL (ref 65–99)
HCT VFR BLD AUTO: 24.9 % (ref 42–52)
HGB BLD-MCNC: 7.6 G/DL (ref 14–18)
IMM GRANULOCYTES # BLD AUTO: 0.01 K/UL (ref 0–0.11)
IMM GRANULOCYTES NFR BLD AUTO: 0.2 % (ref 0–0.9)
LYMPHOCYTES # BLD AUTO: 0.93 K/UL (ref 1–4.8)
LYMPHOCYTES NFR BLD: 21.4 % (ref 22–41)
MCH RBC QN AUTO: 27.5 PG (ref 27–33)
MCHC RBC AUTO-ENTMCNC: 30.5 G/DL (ref 33.7–35.3)
MCV RBC AUTO: 90.2 FL (ref 81.4–97.8)
MONOCYTES # BLD AUTO: 0.36 K/UL (ref 0–0.85)
MONOCYTES NFR BLD AUTO: 8.3 % (ref 0–13.4)
NEUTROPHILS # BLD AUTO: 2.55 K/UL (ref 1.82–7.42)
NEUTROPHILS NFR BLD: 58.6 % (ref 44–72)
NRBC # BLD AUTO: 0 K/UL
NRBC BLD-RTO: 0 /100 WBC
PLATELET # BLD AUTO: 247 K/UL (ref 164–446)
PMV BLD AUTO: 9.6 FL (ref 9–12.9)
RBC # BLD AUTO: 2.76 M/UL (ref 4.7–6.1)
SIGNIFICANT IND 70042: NORMAL
SITE SITE: NORMAL
SOURCE SOURCE: NORMAL
WBC # BLD AUTO: 4.4 K/UL (ref 4.8–10.8)

## 2019-06-12 PROCEDURE — 700102 HCHG RX REV CODE 250 W/ 637 OVERRIDE(OP): Performed by: INTERNAL MEDICINE

## 2019-06-12 PROCEDURE — 700111 HCHG RX REV CODE 636 W/ 250 OVERRIDE (IP): Performed by: HOSPITALIST

## 2019-06-12 PROCEDURE — A9270 NON-COVERED ITEM OR SERVICE: HCPCS | Performed by: NURSE PRACTITIONER

## 2019-06-12 PROCEDURE — 97530 THERAPEUTIC ACTIVITIES: CPT

## 2019-06-12 PROCEDURE — 85025 COMPLETE CBC W/AUTO DIFF WBC: CPT

## 2019-06-12 PROCEDURE — 700111 HCHG RX REV CODE 636 W/ 250 OVERRIDE (IP): Performed by: INTERNAL MEDICINE

## 2019-06-12 PROCEDURE — 700102 HCHG RX REV CODE 250 W/ 637 OVERRIDE(OP): Performed by: HOSPITALIST

## 2019-06-12 PROCEDURE — 36415 COLL VENOUS BLD VENIPUNCTURE: CPT

## 2019-06-12 PROCEDURE — 770006 HCHG ROOM/CARE - MED/SURG/GYN SEMI*

## 2019-06-12 PROCEDURE — A9270 NON-COVERED ITEM OR SERVICE: HCPCS | Performed by: INTERNAL MEDICINE

## 2019-06-12 PROCEDURE — A9270 NON-COVERED ITEM OR SERVICE: HCPCS | Performed by: HOSPITALIST

## 2019-06-12 PROCEDURE — 82962 GLUCOSE BLOOD TEST: CPT

## 2019-06-12 PROCEDURE — 99232 SBSQ HOSP IP/OBS MODERATE 35: CPT | Performed by: HOSPITALIST

## 2019-06-12 PROCEDURE — 700102 HCHG RX REV CODE 250 W/ 637 OVERRIDE(OP): Performed by: NURSE PRACTITIONER

## 2019-06-12 RX ADMIN — HYDROXYZINE HYDROCHLORIDE 10 MG: 10 TABLET, FILM COATED ORAL at 06:53

## 2019-06-12 RX ADMIN — GABAPENTIN 600 MG: 300 CAPSULE ORAL at 06:47

## 2019-06-12 RX ADMIN — CEFAZOLIN SODIUM 2 G: 2 INJECTION, SOLUTION INTRAVENOUS at 06:47

## 2019-06-12 RX ADMIN — MORPHINE SULFATE 15 MG: 15 TABLET, EXTENDED RELEASE ORAL at 17:03

## 2019-06-12 RX ADMIN — ACETAMINOPHEN 1000 MG: 500 TABLET ORAL at 17:03

## 2019-06-12 RX ADMIN — NICOTINE 21 MG: 21 PATCH, EXTENDED RELEASE TRANSDERMAL at 06:47

## 2019-06-12 RX ADMIN — OXYCODONE HYDROCHLORIDE 10 MG: 5 TABLET ORAL at 04:19

## 2019-06-12 RX ADMIN — MAGNESIUM 64 MG (MAGNESIUM CHLORIDE) TABLET,DELAYED RELEASE 64 MG: at 06:53

## 2019-06-12 RX ADMIN — MAGNESIUM 64 MG (MAGNESIUM CHLORIDE) TABLET,DELAYED RELEASE 64 MG: at 17:06

## 2019-06-12 RX ADMIN — GABAPENTIN 600 MG: 300 CAPSULE ORAL at 17:03

## 2019-06-12 RX ADMIN — OXYCODONE HYDROCHLORIDE 10 MG: 5 TABLET ORAL at 12:49

## 2019-06-12 RX ADMIN — INSULIN HUMAN 2 UNITS: 100 INJECTION, SOLUTION PARENTERAL at 13:50

## 2019-06-12 RX ADMIN — CEFAZOLIN SODIUM 2 G: 2 INJECTION, SOLUTION INTRAVENOUS at 13:48

## 2019-06-12 RX ADMIN — INSULIN HUMAN 2 UNITS: 100 INJECTION, SOLUTION PARENTERAL at 20:31

## 2019-06-12 RX ADMIN — CYANOCOBALAMIN TAB 500 MCG 1000 MCG: 500 TAB at 06:46

## 2019-06-12 RX ADMIN — SENNOSIDES,DOCUSATE SODIUM 2 TABLET: 8.6; 5 TABLET, FILM COATED ORAL at 06:47

## 2019-06-12 RX ADMIN — MAGNESIUM HYDROXIDE 30 ML: 400 SUSPENSION ORAL at 17:03

## 2019-06-12 RX ADMIN — ACETAMINOPHEN 1000 MG: 500 TABLET ORAL at 00:33

## 2019-06-12 RX ADMIN — ENOXAPARIN SODIUM 40 MG: 100 INJECTION SUBCUTANEOUS at 06:47

## 2019-06-12 RX ADMIN — OXYCODONE HYDROCHLORIDE 10 MG: 5 TABLET ORAL at 17:03

## 2019-06-12 RX ADMIN — OXYCODONE HYDROCHLORIDE 10 MG: 5 TABLET ORAL at 20:27

## 2019-06-12 RX ADMIN — MORPHINE SULFATE 15 MG: 15 TABLET, EXTENDED RELEASE ORAL at 06:47

## 2019-06-12 RX ADMIN — INSULIN HUMAN 2 UNITS: 100 INJECTION, SOLUTION PARENTERAL at 10:18

## 2019-06-12 RX ADMIN — OXYCODONE HYDROCHLORIDE 10 MG: 5 TABLET ORAL at 23:25

## 2019-06-12 RX ADMIN — CEFAZOLIN SODIUM 2 G: 2 INJECTION, SOLUTION INTRAVENOUS at 21:37

## 2019-06-12 RX ADMIN — HYDROXYZINE HYDROCHLORIDE 10 MG: 10 TABLET, FILM COATED ORAL at 21:41

## 2019-06-12 RX ADMIN — OXYCODONE HYDROCHLORIDE 10 MG: 5 TABLET ORAL at 08:38

## 2019-06-12 RX ADMIN — ACETAMINOPHEN 1000 MG: 500 TABLET ORAL at 12:49

## 2019-06-12 RX ADMIN — OXYCODONE HYDROCHLORIDE 10 MG: 5 TABLET ORAL at 00:33

## 2019-06-12 RX ADMIN — SENNOSIDES,DOCUSATE SODIUM 2 TABLET: 8.6; 5 TABLET, FILM COATED ORAL at 17:03

## 2019-06-12 RX ADMIN — LISINOPRIL 20 MG: 20 TABLET ORAL at 06:47

## 2019-06-12 RX ADMIN — CYCLOBENZAPRINE HYDROCHLORIDE 10 MG: 10 TABLET, FILM COATED ORAL at 23:25

## 2019-06-12 RX ADMIN — GABAPENTIN 600 MG: 300 CAPSULE ORAL at 12:49

## 2019-06-12 RX ADMIN — ACETAMINOPHEN 1000 MG: 500 TABLET ORAL at 06:46

## 2019-06-12 ASSESSMENT — ENCOUNTER SYMPTOMS
STRIDOR: 0
EYE DISCHARGE: 0
DIZZINESS: 0
LOSS OF CONSCIOUSNESS: 0
VOMITING: 0
SHORTNESS OF BREATH: 0
PALPITATIONS: 0
ABDOMINAL PAIN: 0
WHEEZING: 0
SORE THROAT: 0
HEADACHES: 0
SPEECH CHANGE: 0
DIARRHEA: 0
FEVER: 0
EYE PAIN: 0
BRUISES/BLEEDS EASILY: 0
EYE REDNESS: 0
CHILLS: 0
FOCAL WEAKNESS: 0
NAUSEA: 0
COUGH: 0
NERVOUS/ANXIOUS: 0
SINUS PAIN: 0
WEAKNESS: 0
SEIZURES: 0
INSOMNIA: 0
DEPRESSION: 0
SENSORY CHANGE: 0
CONSTIPATION: 0

## 2019-06-12 ASSESSMENT — COGNITIVE AND FUNCTIONAL STATUS - GENERAL
DAILY ACTIVITIY SCORE: 24
SUGGESTED CMS G CODE MODIFIER DAILY ACTIVITY: CH

## 2019-06-12 NOTE — PROGRESS NOTES
53yoM with right leg abscess s/p I&D 6/7.  Admitted to hospitalist service with multiple medical issues.     S: Resting comfortably    O:    Vitals:    06/12/19 1600   BP: 100/52   Pulse: 62   Resp: 18   Temp: 36.6 °C (97.8 °F)   SpO2: 95%     Exam:  General-NAD, resting comfortably  RLE-dressing c/d/i, small amount of serosanguinous drainage in hemovac, foot well perfused    Hemovac drain 20cc out today so far    A: 53yoM with right leg abscess s/p I&D 6/7 (MSSA).  Admitted to hospitalist service with multiple medical issues.     Recs:  --WBAT RLE  --continue abx  --dressing change and hemovac out tomorrow if output remains low  --fu 2 weeks postop for staple removal

## 2019-06-12 NOTE — THERAPY
"Occupational Therapy Treatment completed with focus on ADLs and ADL transfers.  Functional Status:  Supervised with ADLs and txfs  Plan of Care: Patient with no further skilled OT needs in the acute care setting at this time  Discharge Recommendations:Currently anticipate no further skilled therapy needs once patient is discharged from the inpatient setting.    See \"Rehab Therapy-Acute\" Patient Summary Report for complete documentation.   "

## 2019-06-12 NOTE — DISCHARGE PLANNING
Received Choice form at 1020.  Agency/Facility Name: [facility]   Referral sent per Choice form at 1025.

## 2019-06-12 NOTE — CARE PLAN
Problem: Safety  Goal: Will remain free from falls  Safety precautions in place. Call light within reach. Bed is low and in locked position. Hourly rounding in place.

## 2019-06-12 NOTE — PROGRESS NOTES
"   Orthopaedic Progress Note    Interval changes:  Patient doing well  HV in place until 48 hours of output from drain less than 30cc  Ok to resume lovenox or heparin- do not resume noac's until thursday    ROS - Patient denies any new issues.  Pain well controlled.    /47   Pulse 81   Temp 37.1 °C (98.8 °F) (Temporal)   Resp 15   Ht 1.88 m (6' 2.02\")   Wt 107.9 kg (237 lb 14 oz)   SpO2 99%       Patient seen and examined  No acute distress  Breathing non labored  RRR  RLE surgical dressing is clean, dry, and intact. HV in place with sanguinous exudate noted.  Patient clearly fires tibialis anterior, EHL, and gastrocnemius/soleus. Sensation is intact to light touch throughout superficial peroneal, deep peroneal, tibial, saphenous, and sural nerve distributions. Strong and palpable 2+ dorsalis pedis and posterior tibial pulses with capillary refill less than 2 seconds. No lower leg tenderness or discomfort.       Recent Labs      06/09/19   0313  06/10/19   0354  06/11/19   0411   WBC  5.7  5.6  4.9   RBC  2.71*  2.71*  2.89*   HEMOGLOBIN  7.5*  7.4*  7.8*   HEMATOCRIT  24.2*  24.5*  26.3*   MCV  89.3  90.4  91.0   MCH  27.7  27.3  27.0   MCHC  31.0*  30.2*  29.7*   RDW  51.0*  50.5*  51.3*   PLATELETCT  247  233  259   MPV  9.0  9.4  9.6       Active Hospital Problems    Diagnosis   • Abscess of calf [L02.419]     Priority: High   • Pyogenic arthritis of right knee joint (HCC) [M00.9]     Priority: High   • Weakness [R53.1]     Priority: Medium   • DVT (deep venous thrombosis) (HCC) [I82.409]     Priority: Medium   • Diabetes mellitus type 2 in nonobese (HCC) [E11.9]     Priority: Medium   • Hypertension [I10]     Priority: Low   • Chronic back pain [M54.9, G89.29]     Priority: Low   • B12 deficiency anemia [D51.9]     Priority: Low   • Tobacco dependence [F17.200]     Priority: Low       Assessment/Plan:  HV in place until output less than 30 cc in 48 hours  POD#4 S/P:  1.  Right knee " arthrocentesis.  2.  Right leg abscess incision and drainage  Wt bearing status - WBAT  Wound care/Drains - HV in place until output less than 30 cc in 48 hours   Future Procedures - none planned   Sutures/Staples out- 10-14 days post operatively  PT/OT-initiated  Antibiotics: Ancef 2g IV Q8  DVT Prophylaxis- TEDS/SCDs/Foot pumps  [place]-none  Case Coordination for Discharge Planning - Disposition pending abx needs

## 2019-06-12 NOTE — DISCHARGE PLANNING
ATTN: Case Management  RE: Referral for Home Health    As of 6/12/19, we have accepted the Home Health referral for the patient listed above.    A [facility] clinician will be out to see the patient within 48 hours. If you have any questions or concerns regarding the patient’s transition to Home Health, please do not hesitate to contact us at [phone].      We look forward to collaborating with you,  [facility] Home Health Team

## 2019-06-12 NOTE — PROGRESS NOTES
Hospital Medicine Daily Progress Note    Date of Service  6/12/2019    Chief Complaint  Right leg pain, swelling, redness    Hospital Course      53-year-old male with a past medical history of DVT on Eliquis admitted 6/6/2019 with right leg pain, swelling, and erythema. Ultrasound of the affected leg showed a calf mass most consistent with deep abscess so the patient was placed on IV ceftriaxone and vancomycin, and orthopedic surgery was consulted.  A CT was done and showed a 15.9 cm abscess in the posterior upper calf with associated moderate right knee effusion and synovial hyperenhancement consistent with septic arthritis, though fluid analysis was no consistent with septic arthritis. On 6/7/19 he was taken for I&D and vac placement of the right leg by Dr. Harris. Infectious disease was consulted. Intraoperative cultures were positive for MSSA so antibiotics were changed to IV cefazolin.  With anticipation of changing to a 2 week course of oral augmentin at discharge (estimated end date 6/21/19)         Interval Problem Update  Heart rate 70s-80s, saturating well on room air.  Blood pressure within normal limits apart from one reading of 97/48, patient denies dizziness and lightheadedness  Drain made 60 mL's over the past 24 hours, keeping vac until drainage < 30cc/24h, per ortho recs.   Hb 7.8 > 7.6, continue to monitor, transfuse for hemoglobin less than 7  No fevers or chills   Blood sugars 155-176  Discussed with patient, patient's nurse and with multidisciplinary team during rounds including , and charge nurse.    Needs ID clinic follow up in 1-2 weeks.  I discussed with orthopedics PA [Rudy], tess puri A/C Thur 3/13     Consultants/Specialty  Orthopedic surgery  Infectious disease    Code Status  Full Code    Disposition  Home with .   Still waiting on hemovac to be removed but drainage is currently too high for that.     Review of Systems  Review of Systems   Constitutional: Negative for  chills, fever and malaise/fatigue.   HENT: Negative for sinus pain and sore throat.    Eyes: Negative for pain, discharge and redness.   Respiratory: Negative for cough, shortness of breath, wheezing and stridor.    Cardiovascular: Positive for leg swelling (RLE, improving). Negative for chest pain and palpitations.   Gastrointestinal: Negative for abdominal pain, constipation, diarrhea, nausea and vomiting.   Genitourinary: Negative for dysuria, frequency, hematuria and urgency.   Musculoskeletal: Positive for joint pain (right knee).        Sharp shooting pains down his right leg, intermittent   Neurological: Negative for dizziness, sensory change, speech change, focal weakness, seizures, loss of consciousness, weakness and headaches.   Endo/Heme/Allergies: Does not bruise/bleed easily.   Psychiatric/Behavioral: Negative for depression. The patient is not nervous/anxious and does not have insomnia.       Physical Exam  Temp:  [36.6 °C (97.9 °F)-37.2 °C (98.9 °F)] 37.2 °C (98.9 °F)  Pulse:  [76-89] 76  Resp:  [15-19] 16  BP: ()/(47-67) 97/48  SpO2:  [95 %-99 %] 97 %    Physical Exam   Constitutional: He is oriented to person, place, and time. He appears well-developed and well-nourished. He is active and cooperative. He appears ill (chronically ill-appearing). No distress.   HENT:   Head: Normocephalic and atraumatic.   Eyes: Pupils are equal, round, and reactive to light. Conjunctivae are normal. Right eye exhibits no discharge. Left eye exhibits no discharge. No scleral icterus.   Neck: Normal range of motion. Neck supple. No JVD present. No tracheal deviation present.   Cardiovascular: Normal rate, regular rhythm, normal heart sounds and intact distal pulses.  Exam reveals no gallop and no friction rub.    No murmur heard.  Pulmonary/Chest: Effort normal. No accessory muscle usage or stridor. No respiratory distress. He has no decreased breath sounds. He has no wheezes. He has no rhonchi. He has no rales.     Abdominal: Soft. He exhibits no distension and no abdominal bruit. Bowel sounds are increased. There is no tenderness. There is no rigidity and no guarding.   Musculoskeletal: He exhibits edema (RLE).        Right knee: He exhibits decreased range of motion and swelling.   Hemovac drain on RLE    Neurological: He is alert and oriented to person, place, and time. GCS eye subscore is 4. GCS verbal subscore is 5. GCS motor subscore is 6.   Skin: Skin is warm and dry. He is not diaphoretic. No erythema. No pallor.        Psychiatric: He has a normal mood and affect. His speech is normal and behavior is normal. Judgment normal. Cognition and memory are normal.   Nursing note and vitals reviewed.    Fluids    Intake/Output Summary (Last 24 hours) at 06/12/19 1335  Last data filed at 06/12/19 1200   Gross per 24 hour   Intake                0 ml   Output             2480 ml   Net            -2480 ml     Laboratory  Recent Labs      06/10/19   0354  06/11/19   0411  06/12/19   0332   WBC  5.6  4.9  4.4*   RBC  2.71*  2.89*  2.76*   HEMOGLOBIN  7.4*  7.8*  7.6*   HEMATOCRIT  24.5*  26.3*  24.9*   MCV  90.4  91.0  90.2   MCH  27.3  27.0  27.5   MCHC  30.2*  29.7*  30.5*   RDW  50.5*  51.3*  50.5*   PLATELETCT  233  259  247   MPV  9.4  9.6  9.6     Recent Labs      06/10/19   0354   SODIUM  135   POTASSIUM  4.4   CHLORIDE  105   CO2  22   GLUCOSE  154*   BUN  10   CREATININE  0.66   CALCIUM  8.5     Imaging  US-EXTREMITY VENOUS LOWER UNILAT RIGHT   Final Result      CT-EXTREMITY, LOWER WITH RIGHT   Final Result      1.  A 15.9 cm abscess in the posterior upper calf as described.   2.  Associated moderate knee effusion with synovial hyperenhancement, consistent with septic arthritis.   3.  No cortical erosion/destruction to suggest osteomyelitis.   4.  No acute fracture or dislocation.      DX-FEMUR-2+ RIGHT   Final Result      Mild right hip osteoarthrosis. No acute fracture or dislocation.         Assessment/Plan  *  Abscess of calf- (present on admission)   Assessment & Plan    S/p I&D 6/7 with hemovac placement.   Cultures + MSSA. Covering with IV ancef while inpatient. Will transition to PO augmentin [X2 Weeks] at discharge.   Ortho planning for hemovac d/c once drainage < 30 cc/24h.        Pyogenic arthritis of right knee joint (HCC)- (present on admission)   Assessment & Plan    Intraop cultures + MSSA. Continuing IV ancef while inpatient. Anticipate transitioning to PO augmentin at discharge, end date tentatively 6/21 but may be extended depending on clinical course).  Continue pain control.        Weakness- (present on admission)   Assessment & Plan    Multifactorial, age, recent infection, diabetes  Physical and occupational therapy evaluation      DVT (deep venous thrombosis) (HCC)- (present on admission)   Assessment & Plan    Was on eliquis outpt.   Waiting on official surgical clearance from surgery on starting of AC.   I discussed with orthopedics PA [Rudy], can start A/C Thur 3/13    Continue postop SCD to LLE.  Encouraging mobilization.        Diabetes mellitus type 2 in nonobese (HCC)- (present on admission)   Assessment & Plan    Holding home glipizide & metformin for now.  Blood sugars have been reasonably controlled  Continue accuchecks ACHS with SSI as required.   Continue diabetic diet.   Hypoglycemia protocol      Chronic back pain- (present on admission)   Assessment & Plan    Maintained on scheduled MS Contin & prn flexeril, motrin (monitor kidney function closely), & percocet at home.   Continue MS Contin & prn percocet. Adjust as necessary.   Nursing to also utilize non-pharmacologic modalities.      Hypertension- (present on admission)   Assessment & Plan    Lisinopril.      Tobacco dependence- (present on admission)   Assessment & Plan    Continue to encourage the importance of cessation.   Nicotine replacement protocol in place.      B12 deficiency anemia- (present on admission)   Assessment & Plan     S/p IM B12   Oral B12 supplementation         VTE prophylaxis: SCD's. Lovenox. Waiting on OK by orthopedic surgery to restart AC.  I discussed with orthopedics, can start A/C Thur 3/13

## 2019-06-12 NOTE — CARE PLAN
"Problem: Bowel/Gastric:  Goal: Will not experience complications related to bowel motility  Outcome: PROGRESSING SLOWER THAN EXPECTED  Pt had a bowel movement 6/6. Pt reports bowel motility within baseline. Pt states, \" That's normal for me to not go for a week\".  Bowel sounds hypoactive in all four quadrants. Pt educated on narcotic use and constipation. Pt educated on importance of regular bowel motility. Pt verbalizes understanding, but continues to refuse bowel protocol despite education. Pt encouraged to ambulate and remain hydrated.     Problem: Knowledge Deficit  Goal: Knowledge of the prescribed therapeutic regimen will improve  Outcome: PROGRESSING AS EXPECTED  Reviewed POC including safety, mobility, pain management, medication administration, DVT prophylaxis, and discharge. Pt has no needs or concerns at this time.       "

## 2019-06-12 NOTE — DISCHARGE PLANNING
Care Transition Team Assessment    Information Source  Orientation : Oriented x 4  Information Given By: Patient  Informant's Name: Rudy    Readmission Evaluation  Is this a readmission?: No    Elopement Risk  Legal Hold: No  Ambulatory or Self Mobile in Wheelchair: Yes  Disoriented: No  Psychiatric Symptoms: None  History of Wandering: No  Elopement this Admit: No  Vocalizing Wanting to Leave: No  Displays Behaviors, Body Language Wanting to Leave: No-Not at Risk for Elopement  Elopement Risk: Not at Risk for Elopement    Interdisciplinary Discharge Planning  Does Admitting Nurse Feel This Could be a Complex Discharge?: No  Primary Care Physician: Sheri Pineda  Lives with - Patient's Self Care Capacity: Significant Other  Patient or legal guardian wants to designate a caregiver (see row info): No  Support Systems: Spouse / Significant Other  Housing / Facility: 2 Story Apartment / Condo  Do You Take your Prescribed Medications Regularly: Yes  Able to Return to Previous ADL's: Yes  Mobility Issues: Yes  Prior Services: Home-Independent, Skilled Home Health Services, Intermittent Physical Support for ADL Per Family  Patient Expects to be Discharged to:: Home  Assistance Needed: No  Durable Medical Equipment: Not Applicable    Discharge Preparedness  What is your plan after discharge?: Home health care  What are your discharge supports?: Other (comment) (SO)  Prior Functional Level: Independent with Activities of Daily Living    Functional Assesment  Prior Functional Level: Independent with Activities of Daily Living    Finances  Financial Barriers to Discharge: No  Prescription Coverage: Yes    Vision / Hearing Impairment  Vision Impairment : Yes  Right Eye Vision: Impaired, Wears Glasses  Left Eye Vision: Impaired, Wears Glasses  Hearing Impairment : No              Domestic Abuse  Have you ever been the victim of abuse or violence?: No  Physical Abuse or Sexual Abuse: No  Verbal Abuse or Emotional Abuse:  No  Possible Abuse Reported to:: Not Applicable    Psychological Assessment  History of Substance Abuse: None  History of Psychiatric Problems: No  Non-compliant with Treatment: No  Newly Diagnosed Illness: No    Discharge Risks or Barriers  Discharge risks or barriers?: No    Anticipated Discharge Information  Anticipated discharge disposition: [facility], Home  Discharge Address: [address] [place], [place]  Discharge Contact Phone Number: [phone]    Anticipated Discharge Disposition: Home with     Action: Met with patient lives with his S.O. Phuong who can assist as needed. Was current with [facility]  prior to admit and would like to have a referral back with them. Choice form faxed to [facility]    Barriers to Discharge: medical clearance and HH acceptance    Plan: Home with

## 2019-06-13 ENCOUNTER — PATIENT OUTREACH (OUTPATIENT)
Dept: HEALTH INFORMATION MANAGEMENT | Facility: OTHER | Age: 54
End: 2019-06-13

## 2019-06-13 VITALS
WEIGHT: 237.88 LBS | DIASTOLIC BLOOD PRESSURE: 69 MMHG | HEIGHT: 74 IN | HEART RATE: 87 BPM | RESPIRATION RATE: 17 BRPM | OXYGEN SATURATION: 98 % | TEMPERATURE: 97.2 F | BODY MASS INDEX: 30.53 KG/M2 | SYSTOLIC BLOOD PRESSURE: 122 MMHG

## 2019-06-13 LAB — GLUCOSE BLD-MCNC: 118 MG/DL (ref 65–99)

## 2019-06-13 PROCEDURE — A9270 NON-COVERED ITEM OR SERVICE: HCPCS | Performed by: INTERNAL MEDICINE

## 2019-06-13 PROCEDURE — 700111 HCHG RX REV CODE 636 W/ 250 OVERRIDE (IP): Performed by: INTERNAL MEDICINE

## 2019-06-13 PROCEDURE — 700102 HCHG RX REV CODE 250 W/ 637 OVERRIDE(OP): Performed by: HOSPITALIST

## 2019-06-13 PROCEDURE — 700102 HCHG RX REV CODE 250 W/ 637 OVERRIDE(OP): Performed by: NURSE PRACTITIONER

## 2019-06-13 PROCEDURE — A9270 NON-COVERED ITEM OR SERVICE: HCPCS | Performed by: NURSE PRACTITIONER

## 2019-06-13 PROCEDURE — 99239 HOSP IP/OBS DSCHRG MGMT >30: CPT | Performed by: HOSPITALIST

## 2019-06-13 PROCEDURE — 82962 GLUCOSE BLOOD TEST: CPT

## 2019-06-13 PROCEDURE — A9270 NON-COVERED ITEM OR SERVICE: HCPCS | Performed by: HOSPITALIST

## 2019-06-13 PROCEDURE — 700111 HCHG RX REV CODE 636 W/ 250 OVERRIDE (IP): Performed by: HOSPITALIST

## 2019-06-13 PROCEDURE — 700102 HCHG RX REV CODE 250 W/ 637 OVERRIDE(OP): Performed by: INTERNAL MEDICINE

## 2019-06-13 RX ORDER — CEFAZOLIN SODIUM 2 G/100ML
2 INJECTION, SOLUTION INTRAVENOUS EVERY 8 HOURS
Status: DISCONTINUED | OUTPATIENT
Start: 2019-06-13 | End: 2019-06-13

## 2019-06-13 RX ORDER — CEFAZOLIN SODIUM 2 G/100ML
2 INJECTION, SOLUTION INTRAVENOUS EVERY 8 HOURS
Status: DISCONTINUED | OUTPATIENT
Start: 2019-06-13 | End: 2019-06-13 | Stop reason: HOSPADM

## 2019-06-13 RX ORDER — AMOXICILLIN AND CLAVULANATE POTASSIUM 875; 125 MG/1; MG/1
1 TABLET, FILM COATED ORAL 2 TIMES DAILY
Qty: 28 TAB | Refills: 0 | Status: SHIPPED | OUTPATIENT
Start: 2019-06-13 | End: 2019-06-27

## 2019-06-13 RX ORDER — OXYCODONE HYDROCHLORIDE 5 MG/1
5 TABLET ORAL EVERY 6 HOURS PRN
Qty: 12 TAB | Refills: 0 | Status: SHIPPED | OUTPATIENT
Start: 2019-06-13 | End: 2019-06-16

## 2019-06-13 RX ORDER — ACETAMINOPHEN 500 MG
500 TABLET ORAL EVERY 6 HOURS PRN
Qty: 20 TAB | Refills: 0 | Status: SHIPPED | OUTPATIENT
Start: 2019-06-13 | End: 2019-06-17

## 2019-06-13 RX ADMIN — LISINOPRIL 20 MG: 20 TABLET ORAL at 06:34

## 2019-06-13 RX ADMIN — CEFAZOLIN SODIUM 2 G: 2 INJECTION, SOLUTION INTRAVENOUS at 06:35

## 2019-06-13 RX ADMIN — GABAPENTIN 600 MG: 300 CAPSULE ORAL at 11:13

## 2019-06-13 RX ADMIN — NICOTINE 21 MG: 21 PATCH, EXTENDED RELEASE TRANSDERMAL at 06:37

## 2019-06-13 RX ADMIN — ACETAMINOPHEN 1000 MG: 500 TABLET ORAL at 11:13

## 2019-06-13 RX ADMIN — OXYCODONE HYDROCHLORIDE 10 MG: 5 TABLET ORAL at 11:14

## 2019-06-13 RX ADMIN — ENOXAPARIN SODIUM 40 MG: 100 INJECTION SUBCUTANEOUS at 06:38

## 2019-06-13 RX ADMIN — ACETAMINOPHEN 1000 MG: 500 TABLET ORAL at 06:33

## 2019-06-13 RX ADMIN — MAGNESIUM 64 MG (MAGNESIUM CHLORIDE) TABLET,DELAYED RELEASE 64 MG: at 06:35

## 2019-06-13 RX ADMIN — MORPHINE SULFATE 15 MG: 15 TABLET, EXTENDED RELEASE ORAL at 06:34

## 2019-06-13 RX ADMIN — GABAPENTIN 600 MG: 300 CAPSULE ORAL at 06:34

## 2019-06-13 RX ADMIN — OXYCODONE HYDROCHLORIDE 10 MG: 5 TABLET ORAL at 06:34

## 2019-06-13 RX ADMIN — OXYCODONE HYDROCHLORIDE 10 MG: 5 TABLET ORAL at 03:15

## 2019-06-13 RX ADMIN — CYANOCOBALAMIN TAB 500 MCG 1000 MCG: 500 TAB at 06:34

## 2019-06-13 NOTE — DISCHARGE SUMMARY
Discharge Summary    CHIEF COMPLAINT ON ADMISSION  Chief Complaint   Patient presents with   • Leg Pain     right leg worsening pain and swelling, hx blood clot to rle 2 months on eliquis   • Leg Swelling     with redness       Reason for Admission  Leg Pain     Admission Date  6/6/2019    CODE STATUS  Full Code    HPI & HOSPITAL COURSE  53-year-old male with a past medical history of deep vein thrombosis on Eliquis admitted 6/6/2019 with right leg pain, swelling, and erythema. Ultrasound of the affected leg showed a calf mass most consistent with deep abscess so the patient was placed on IV ceftriaxone and vancomycin, and orthopedic surgery was consulted.  A CT was done and showed a 15.9 cm abscess in the posterior upper calf with associated moderate right knee effusion and synovial hyperenhancement consistent with septic arthritis, though fluid analysis was no consistent with septic arthritis. On 6/7/19 he was taken for I&D and vac placement of the right leg by Dr. Harris. Infectious disease was consulted. Intraoperative cultures were positive for MSSA so antibiotics were changed to IV cefazolin.  Infectious disease recommended oral Augmentin on discharge for 2 weeks to follow-up with infectious disease specialist as outpatient.  Orthopedics were okay to restart Eliquis on discharge.  Patient has anemia, he was warned about potential worsening of his anemia, life-threatening bleeding.  He understands and accepts the risks and he will have follow-up with primary care within a week after discharge.  Therefore, he is discharged in fair and stable condition to home with close outpatient follow-up.  The patient met 2-midnight criteria for an inpatient stay at the time of discharge.    Discharge Date  6/13/2019     FOLLOW UP ITEMS POST DISCHARGE  Follow-up with infectious disease within 2 weeks  Follow-up with primary care within 1 week    DISCHARGE DIAGNOSES  Principal Problem:    Abscess of calf POA: Yes  Active  Problems:    Pyogenic arthritis of right knee joint (HCC) POA: Yes    Diabetes mellitus type 2 in nonobese (HCC) POA: Yes    DVT (deep venous thrombosis) ([facility]) POA: Yes    Weakness POA: Yes    B12 deficiency anemia POA: Yes    Tobacco dependence POA: Yes    Hypertension POA: Yes    Chronic back pain POA: Yes  Resolved Problems:    Tachycardia POA: Yes      FOLLOW UP  Future Appointments  Date Time Provider Department Center   6/13/2019 To Be Determined Jeny Waldrop, OT [facility] None   6/13/2019 To Be Determined Annaronny Taylor R.N. [facility] None   6/14/2019 To Be Determined Magnolia Bravo, MSW [facility] None   6/17/2019 To Be Determined Annaronny Taylor R.N. [facility] None   6/20/2019 To Be Determined Annaronny Taylor R.N. [facility] None   6/27/2019 To Be Determined Annaronny Taylor R.N. [facility] None   7/4/2019 To Be Determined Annaronny Taylor R.N. [facility] None   7/11/2019 To Be Determined Annaronny Taylor R.N. [facility] None   7/18/2019 To Be Determined Annaronny Taylor R.N. [facility] None   7/25/2019 To Be Determined Annaronny Taylor R.N. [facility] None     Mohsen Tamasaby, M.D.  [address] [id number] [phone]      [facility]  left a message with your provider regarding need for a hospital follow up. Please contact their office directly if you do not hear from them within two days.Thank you    Paco Harris M.D.  [address] [address]  [phone]      [facility]  left a message with your provider regarding need for a hospital follow up. Please contact their office directly if you do not hear from them within two days.Thank you    Juan Cronin M.D.  [address] [facility] [address]  [facility] [id number] [phone]    In 12 days        MEDICATIONS ON DISCHARGE     Medication List      START taking these medications      Instructions   acetaminophen 500 MG Tabs  Commonly known as:  TYLENOL   Take 1 Tab by mouth every 6 hours as needed.  Dose:  500 mg     amoxicillin-clavulanate 481-125 MG Tabs  Commonly known  as:  AUGMENTIN   Take 1 Tab by mouth 2 times a day for 14 days.  Dose:  1 Tab     cyanocobalamin 1000 MCG Tabs  Start taking on:  6/14/2019  Commonly known as:  VITAMIN B12   Take 1 Tab by mouth every day.  Dose:  1000 mcg     oxyCODONE immediate-release 5 MG Tabs  Commonly known as:  ROXICODONE   Take 1 Tab by mouth every 6 hours as needed for Severe Pain for up to 3 days.  Dose:  5 mg        CONTINUE taking these medications      Instructions   cyclobenzaprine 10 MG Tabs  Commonly known as:  FLEXERIL   Take 10 mg by mouth 3 times a day as needed.  Dose:  10 mg     ELIQUIS 5mg Tabs  Generic drug:  apixaban   Take 5 mg by mouth 2 Times a Day.  Dose:  5 mg     gabapentin 300 MG Caps  Commonly known as:  NEURONTIN   Take 600 mg by mouth 3 times a day.  Dose:  600 mg     glipiZIDE SR 5 MG Tb24  Commonly known as:  GLUCOTROL   Take 5 mg by mouth every day.  Dose:  5 mg     lisinopril 20 MG Tabs  Commonly known as:  PRINIVIL   Take 20 mg by mouth every day.  Dose:  20 mg     metformin 1000 MG tablet  Commonly known as:  GLUCOPHAGE   Take 1,000 mg by mouth 2 times a day, with meals.  Dose:  1000 mg        STOP taking these medications    ibuprofen 600 MG Tabs  Commonly known as:  MOTRIN     MS CONTIN 15 MG Tbcr tablet  Generic drug:  morphine ER     oxyCODONE-acetaminophen  MG Tabs  Commonly known as:  PERCOCET-10          In prescribing controlled substances to this patient, I certify that I have obtained and reviewed the medical history of Rudy Pérez. I have also made a good radha effort to obtain applicable records from other providers who have treated the patient and records demonstrating the following: Moderate risk for abuse .     I have conducted a physical exam and documented it. I have reviewed Mr. Pérez’s prescription history as maintained by the [place] Prescription Monitoring Program.     I have assessed the patient’s risk for abuse, dependency, and addiction using the validated Opioid Risk Tool  "available at https://www.HowStuffWorks.com/eewphw-namq-qdod-ort-narcotic-abuse.     Given the above, I believe the benefits of controlled substance therapy outweigh the risks. The reasons for prescribing controlled substances include non-narcotic, oral analgesic alternatives have been inadequate for pain control. Accordingly, I have discussed the risk and benefits, treatment plan, and alternative therapies with the patient.  Higher risk of bleeding with NSAIDs. Moderate risk for abuse so a limited supply have been prescribed.        Allergies  No Known Allergies    DIET  Orders Placed This Encounter   Procedures   • Diet Order Diabetic     Standing Status:   Standing     Number of Occurrences:   1     Order Specific Question:   Diet:     Answer:   Diabetic [3]       ACTIVITY  As tolerated.  As instructed by orthopedics \" WBAT\"    CONSULTATIONS  Orthopedics.  Infectious disease.    PROCEDURES  Irrigation and debridement June 7 with Dr. Herrera's   Right knee arthrocentesis, right knee incision and drainage. June 7   With Dr. Dakin's    LABORATORY  Lab Results   Component Value Date    SODIUM 135 06/10/2019    POTASSIUM 4.4 06/10/2019    CHLORIDE 105 06/10/2019    CO2 22 06/10/2019    GLUCOSE 154 (H) 06/10/2019    BUN 10 06/10/2019    CREATININE 0.66 06/10/2019        Lab Results   Component Value Date    WBC 4.4 (L) 06/12/2019    HEMOGLOBIN 7.6 (L) 06/12/2019    HEMATOCRIT 24.9 (L) 06/12/2019    PLATELETCT 247 06/12/2019        Total time of the discharge process exceeds 39 minutes.  "

## 2019-06-13 NOTE — PROGRESS NOTES
"   Orthopaedic PA Progress Note    Interval changes:Doing well, HV out, OK for DC from Ortho standpoint. Follow-Up: needs appointment with Dr. Harris at  [facility] at 10-14 days post-op for re-evaluation and suture removal.    ROS - Patient denies any new issues. No chest pain, dyspnea, or fever.  Pain well controlled.    /69   Pulse 87   Temp 36.2 °C (97.2 °F) (Temporal)   Resp 17   Ht 1.88 m (6' 2.02\")   Wt 107.9 kg (237 lb 14 oz)   SpO2 98%     Patient seen and examined  No acute distress  Breathing non labored  RRR  Surgical dressing is clean, dry, and intact. Replaced. Incision clran dry and well - approximated. Patient clearly fires tibialis anterior, EHL, and gastrocnemius/soleus. Sensation is intact to light touch throughout superficial peroneal, deep peroneal, tibial, saphenous, and sural nerve distributions. Strong and palpable 2+ dorsalis pedis and posterior tibial pulses with capillary refill less than 2 seconds. No lower leg tenderness or discomfort. Drain removed , tolerated well. New dressing applied    Recent Labs      06/11/19   0411  06/12/19   0332   WBC  4.9  4.4*   RBC  2.89*  2.76*   HEMOGLOBIN  7.8*  7.6*   HEMATOCRIT  26.3*  24.9*   MCV  91.0  90.2   MCH  27.0  27.5   MCHC  29.7*  30.5*   RDW  51.3*  50.5*   PLATELETCT  259  247   MPV  9.6  9.6       Active Hospital Problems    Diagnosis   • Abscess of calf [L02.419]     Priority: High   • Pyogenic arthritis of right knee joint (HCC) [M00.9]     Priority: High   • Weakness [R53.1]     Priority: Medium   • DVT (deep venous thrombosis) (Formerly [facility]) [I82.409]     Priority: Medium   • Diabetes mellitus type 2 in nonobese (HCC) [E11.9]     Priority: Medium   • Hypertension [I10]     Priority: Low   • Chronic back pain [M54.9, G89.29]     Priority: Low   • B12 deficiency anemia [D51.9]     Priority: Low   • Tobacco dependence [F17.200]     Priority: Low     Assessment/Plan:  POD#6 S/P:  1.  Right knee arthrocentesis.  2. "  Right leg abscess incision and drainage  Wt bearing status - WBAT  Sutures out- 10-14 days post operatively  PT/OT-initiated  Antibiotics: per Med/ID  DVT Prophylaxis- TEDS/SCDs/Foot pumps  Case Coordination for Discharge Planning - Disposition per Med

## 2019-06-13 NOTE — DISCHARGE INSTRUCTIONS
Discharge Instructions    Discharged to home by car with relative. Discharged via wheelchair, hospital escort: Yes.  Special equipment needed: Crutches and Immobilizer    Be sure to schedule a follow-up appointment with your primary care doctor or any specialists as instructed.     Discharge Plan:   Diet Plan: Discussed  Activity Level: Discussed  Smoking Cessation Offered: Patient Refused  Confirmed Follow up Appointment: Patient to Call and Schedule Appointment  Confirmed Symptoms Management: Discussed  Medication Reconciliation Updated: Yes  Influenza Vaccine Indication: Patient Refuses    I understand that a diet low in cholesterol, fat, and sodium is recommended for good health. Unless I have been given specific instructions below for another diet, I accept this instruction as my diet prescription.   Other diet: diabetic    Special Instructions: Discharge instructions for the Orthopedic Patient    Follow up with Primary Care Physician within 2 weeks of discharge to home, regarding:  Review of medications and diagnostic testing.  Surveillance for medical complications.  Workup and treatment of osteoporosis, if appropriate.     -Is this a Joint Replacement patient? No    -Is this patient being discharged with medication to prevent blood clots?  No    · Is patient discharged on Warfarin / Coumadin?   No       How can pain medicine affect me?  You were prescribed pain medicine. This medicine may:  · Make you tired or sleepy.  · Make you feel dizzy.  · Affect how well you can:  ¨ Drive  ¨ Do certain activities.  Pain medicine may not make all of your pain go away. You should be comfortable enough to:  · Move.  · Breathe.  · Take care of yourself.  How often should I take pain medicine and how much should I take?  · Take pain medicine only as told by your doctor and only as needed for pain.  · You do not need to take pain medicine if you are not having pain, unless your doctor tells you to do that.  · You can take  less than the prescribed dose if you find that less medicine helps your pain.  · If you have very bad (severe) pain, call your doctor. Do not take more pills than told by your doctor. Do not take pills more often than told by your doctor.  What should I avoid while I am taking pain medicine?  Follow these instructions after you start taking pain medicine, while you are taking the medicine, and for 8 hours after you stop taking the medicine:  · Do not drive.  · Do not use machinery.  · Do not use power tools.  · Do not sign legal documents.  · Do not drink alcohol.  · Do not take sleeping pills.  · Do not take care of children by yourself.  · Do not do any activities that involve climbing or being in high places.  · Do not go into any body of water unless there is an adult nearby who can watch and help you. This includes:  ¨ Lakes.  ¨ Rivers.  ¨ Oceans.  ¨ Spas.  ¨ Swimming pools.  How can I keep others safe while I am taking pain medicine?  · Store your pain medicine as told by your doctor. Make sure that you keep it where children and pets cannot reach it.  · Do not share your pain medicine with anyone.  · Do not save any leftover pills. If you have any leftover pain medicine, get rid of it or destroy it as told by your doctor.  What else do I need to know about taking pain medicine?  · Use a poop (stool) softener if you have trouble pooping (constipation) because of your pain medicine. Eating more fruits and vegetables also helps with constipation.  · Write down the times when you take your pain medicine. Look at the times before you take your next dose of medicine.  · Your pain medicine might have acetaminophen in it. Do not take any other acetaminophen while you are taking this medicine. An overdose of acetaminophen can do very bad damage to your liver. If you are taking any medicines in addition to your pain medicine, check the active ingredients on those medicines to see if acetaminophen is listed.  When  should I call my doctor?  · Your medicine is not helping the pain.  · You do either of these soon after you take the medicine:  ¨ Throw up (vomit).  ¨ Have watery poop (diarrhea).  · You have new pain in areas that did not hurt before.  · You have an allergic reaction to your medicine. This may include:  ¨ Feeling itchy.  ¨ Swelling.  ¨ Feeling dizzy.  ¨ Getting a new rash.  · You cannot put up with feeling:  ¨ Dizzy.  ¨ Sick to your stomach (nauseous).  When should I call 911 or go to the emergency room?  · You pass out (faint).  · You feel very confused.  · You throw up again and again.  · Your skin or lips turn pale or bluish in color.  · You are:  ¨ Short of breath.  ¨ Breathing much more slowly than usual.  · You have a very bad allergic reaction to your medicine. This includes:  ¨ Developing a swollen tongue.  ¨ Having trouble breathing.  This information is not intended to replace advice given to you by your health care provider. Make sure you discuss any questions you have with your health care provider.  Document Released: 06/05/2009 Document Revised: 08/24/2017 Document Reviewed: 10/22/2015  © 2017 [employer]    Depression / Suicide Risk    As you are discharged from this [facility] facility, it is important to learn how to keep safe from harming yourself.    Recognize the warning signs:  · Abrupt changes in personality, positive or negative- including increase in energy   · Giving away possessions  · Change in eating patterns- significant weight changes-  positive or negative  · Change in sleeping patterns- unable to sleep or sleeping all the time   · Unwillingness or inability to communicate  · Depression  · Unusual sadness, discouragement and loneliness  · Talk of wanting to die  · Neglect of personal appearance   · Rebelliousness- reckless behavior  · Withdrawal from people/activities they love  · Confusion- inability to concentrate     If you or a loved one observes any of these behaviors or has  concerns about self-harm, here's what you can do:  · Talk about it- your feelings and reasons for harming yourself  · Remove any means that you might use to hurt yourself (examples: pills, rope, extension cords, firearm)  · Get professional help from the community (Mental Health, Substance Abuse, psychological counseling)  · Do not be alone:Call your Safe Contact- someone whom you trust who will be there for you.  · Call your local CRISIS HOTLINE 921-3381 or 276-217-6951  · Call your local Children's Mobile Crisis Response Team [place] (031) 451-0037 or www.[place]  · Call the toll free National Suicide Prevention Hotlines   · National Suicide Prevention Lifeline 264-155-TCLE (6189)  · National Hope Line Network 800-SUICIDE (293-3892)

## 2019-06-15 ENCOUNTER — HOME CARE VISIT (OUTPATIENT)
Dept: HOME HEALTH SERVICES | Facility: HOME HEALTHCARE | Age: 54
End: 2019-06-15
Payer: COMMERCIAL

## 2019-06-15 VITALS
RESPIRATION RATE: 20 BRPM | OXYGEN SATURATION: 97 % | WEIGHT: 230 LBS | HEIGHT: 74 IN | SYSTOLIC BLOOD PRESSURE: 100 MMHG | BODY MASS INDEX: 29.52 KG/M2 | HEART RATE: 86 BPM | TEMPERATURE: 99.9 F | DIASTOLIC BLOOD PRESSURE: 64 MMHG

## 2019-06-15 PROCEDURE — G0493 RN CARE EA 15 MIN HH/HOSPICE: HCPCS

## 2019-06-15 ASSESSMENT — PATIENT HEALTH QUESTIONNAIRE - PHQ9
1. LITTLE INTEREST OR PLEASURE IN DOING THINGS: 00
2. FEELING DOWN, DEPRESSED, IRRITABLE, OR HOPELESS: 00
CLINICAL INTERPRETATION OF PHQ2 SCORE: 0

## 2019-06-15 ASSESSMENT — ACTIVITIES OF DAILY LIVING (ADL)
HOME_HEALTH_OASIS: 01
OASIS_M1830: 03

## 2019-06-15 ASSESSMENT — ENCOUNTER SYMPTOMS
NAUSEA: DENIES
VOMITING: DENIES

## 2019-06-17 ENCOUNTER — HOME CARE VISIT (OUTPATIENT)
Dept: HOME HEALTH SERVICES | Facility: HOME HEALTHCARE | Age: 54
End: 2019-06-17
Payer: COMMERCIAL

## 2019-06-17 ENCOUNTER — TELEPHONE (OUTPATIENT)
Dept: INFECTIOUS DISEASES | Facility: MEDICAL CENTER | Age: 54
End: 2019-06-17

## 2019-06-17 ENCOUNTER — ANTICOAGULATION MONITORING (OUTPATIENT)
Dept: MEDICAL GROUP | Facility: PHYSICIAN GROUP | Age: 54
End: 2019-06-17

## 2019-06-17 VITALS
RESPIRATION RATE: 16 BRPM | HEART RATE: 85 BPM | SYSTOLIC BLOOD PRESSURE: 108 MMHG | OXYGEN SATURATION: 99 % | TEMPERATURE: 97.8 F | DIASTOLIC BLOOD PRESSURE: 62 MMHG

## 2019-06-17 VITALS
TEMPERATURE: 98.4 F | HEART RATE: 88 BPM | OXYGEN SATURATION: 98 % | RESPIRATION RATE: 16 BRPM | SYSTOLIC BLOOD PRESSURE: 100 MMHG | DIASTOLIC BLOOD PRESSURE: 60 MMHG

## 2019-06-17 PROCEDURE — G0299 HHS/HOSPICE OF RN EA 15 MIN: HCPCS

## 2019-06-17 PROCEDURE — G0152 HHCP-SERV OF OT,EA 15 MIN: HCPCS

## 2019-06-17 PROCEDURE — A6212 FOAM DRG <=16 SQ IN W/BORDER: HCPCS

## 2019-06-17 PROCEDURE — A6250 SKIN SEAL PROTECT MOISTURIZR: HCPCS

## 2019-06-17 ASSESSMENT — ACTIVITIES OF DAILY LIVING (ADL)
ORAL_CARE_ASSISTANCE: 0
GROOMING_ASSISTANCE: 0
BATHING_ASSISTANCE: 1
EATING_ASSISTANCE: 0
HOUSEKEEPING_ASSISTANCE: 6
TOILETING_ASSISTANCE: 0
DRESSING_LB_ASSISTANCE: 0
SHOPPING_ASSISTANCE: 6
DRESSING_UB_ASSISTANCE: 0
LAUNDRY_ASSISTANCE: 6
TRANSPORTATION_ASSISTANCE: 6
TELEPHONE_ASSISTANCE: 0
MEAL_PREP_ASSISTANCE: 6

## 2019-06-17 ASSESSMENT — ENCOUNTER SYMPTOMS
VOMITING: NO
POOR JUDGMENT: 1
DIFFICULTY THINKING: 1
NAUSEA: NO

## 2019-06-17 NOTE — PROGRESS NOTES
Received referral from [facility]. Medications reviewed.     He currently is on gabapentin and pregabalin.  He will need to follow-up with pain management to determine which one he should be on.  Generally one of these medications is sufficient and there is no need to use 2 medications in the same class for nerve pain    Loco Cortez M.S., Pharm.D., [facility], [facility]

## 2019-06-18 ENCOUNTER — HOME CARE VISIT (OUTPATIENT)
Dept: HOME HEALTH SERVICES | Facility: HOME HEALTHCARE | Age: 54
End: 2019-06-18
Payer: COMMERCIAL

## 2019-06-18 ENCOUNTER — TELEPHONE (OUTPATIENT)
Dept: INFECTIOUS DISEASES | Facility: MEDICAL CENTER | Age: 54
End: 2019-06-18

## 2019-06-18 VITALS
HEART RATE: 17 BPM | DIASTOLIC BLOOD PRESSURE: 62 MMHG | SYSTOLIC BLOOD PRESSURE: 124 MMHG | RESPIRATION RATE: 17 BRPM | TEMPERATURE: 99.3 F | OXYGEN SATURATION: 98 %

## 2019-06-18 PROCEDURE — G0151 HHCP-SERV OF PT,EA 15 MIN: HCPCS

## 2019-06-18 SDOH — ECONOMIC STABILITY: HOUSING INSECURITY
HOME SAFETY: CLUTTERED SMALL ROOM AT TOP OF STEEP STEPS IN MULTI FUNCTION HOME  PT AND GF WORK TO KEEP WALKING PATHWAYS CLEAR, ALLOW FOR CX USE AS REPORTED  PT REPORTS INCR OOB ACTIVITY TO PREVENT FUNCTIONAL SEDENTARY DECLINE

## 2019-06-18 ASSESSMENT — ACTIVITIES OF DAILY LIVING (ADL): ADLS_COMMENTS: <!--EPICS-->SEE OT EVAL<!--EPICE-->

## 2019-06-18 NOTE — TELEPHONE ENCOUNTER
Called and spoke to pt. He will check his schedule and call back to schedule an appt with Infectious Disease.  -AMP

## 2019-06-19 ENCOUNTER — HOME CARE VISIT (OUTPATIENT)
Dept: HOME HEALTH SERVICES | Facility: HOME HEALTHCARE | Age: 54
End: 2019-06-19
Payer: COMMERCIAL

## 2019-06-19 ENCOUNTER — OFFICE VISIT (OUTPATIENT)
Dept: INFECTIOUS DISEASES | Facility: MEDICAL CENTER | Age: 54
End: 2019-06-19
Payer: COMMERCIAL

## 2019-06-19 VITALS
WEIGHT: 230 LBS | TEMPERATURE: 97.6 F | BODY MASS INDEX: 29.52 KG/M2 | HEART RATE: 86 BPM | DIASTOLIC BLOOD PRESSURE: 68 MMHG | SYSTOLIC BLOOD PRESSURE: 110 MMHG | OXYGEN SATURATION: 98 % | HEIGHT: 74 IN

## 2019-06-19 DIAGNOSIS — F17.200 TOBACCO DEPENDENCE: ICD-10-CM

## 2019-06-19 DIAGNOSIS — L02.419 ABSCESS OF CALF: ICD-10-CM

## 2019-06-19 DIAGNOSIS — M00.9 PYOGENIC ARTHRITIS OF RIGHT KNEE JOINT, DUE TO UNSPECIFIED ORGANISM (HCC): ICD-10-CM

## 2019-06-19 DIAGNOSIS — E11.9 DIABETES MELLITUS TYPE 2 IN NONOBESE (HCC): ICD-10-CM

## 2019-06-19 PROCEDURE — 99213 OFFICE O/P EST LOW 20 MIN: CPT | Performed by: NURSE PRACTITIONER

## 2019-06-19 PROCEDURE — G0152 HHCP-SERV OF OT,EA 15 MIN: HCPCS

## 2019-06-19 ASSESSMENT — ENCOUNTER SYMPTOMS
SHORTNESS OF BREATH: 0
FEVER: 0
CONSTIPATION: 0
CHILLS: 0
ABDOMINAL PAIN: 0
VOMITING: 0
MYALGIAS: 1
DIARRHEA: 0
HEADACHES: 0
SENSORY CHANGE: 0
NERVOUS/ANXIOUS: 0
NAUSEA: 0
BACK PAIN: 1

## 2019-06-19 NOTE — PROGRESS NOTES
Infectious Disease Clinic    Subjective:     Chief Complaint   Patient presents with   • Hospital Follow-up     Right lower extremity abscess and Right knee effusion     This is my first time meeting Mr. Pérez.  Accompanied by his girlfriend.    Interval History: 53 y.o. man with a history of type 2 diabetes mellitus complicated by diabetic peripheral neuropathy, recent hamstring tear with avulsion fracture of the distal right femur complicated by a DVT on Eliquis and hypertension.  Hospitalized from 6/6-6/13/2019, admitted for worsening right calf pain with swelling.  Patient stated approximately 3 months ago he was moving but denies any associated trauma or injury.  He developed progressive right lower extremity pain exacerbated by standing and walking.  He was subsequently hospitalized at [facility] where he was found to have a muscle tear and an avulsion fracture of the distal right femur complicated by a DVT and was started on Eliquis.  Per the patient, there was also report of diabetic foot infections for which a PICC line was placed and he was transferred to [facility] for IV antibiotics for a plan 2-week course.  He does not know antibiotics he was being given.  While there he had been undergoing physical therapy.  He reportedly had been doing well without any new issues until earlier on the day of admission, he developed acute onset of right knee and leg pain associated with swelling and erythema.  He was noted to have diminished pulses of the right lower extremity.  He was unable to stand or ambulate.  On presentation, he was afebrile without any leukocytosis.  Blood cultures on 6/6 were negative.  An ultrasound showed a fluid collection of the calf and a CT scan of the right lower extremity revealed a 15.9 cm abscess in the posterior upper calf in addition to a moderate knee effusion concerning for septic arthritis but no evidence of osteomyelitis.  He underwent right knee  arthrocentesis and right leg abscess I&D on 6/7 with Dr. Harris.  Copious amounts of purulence was noted intraoperatively and down to the deep posterior compartments.  OR culture of leg abscess positive MSSA.  OR culture of right synovial fluid negative, but fluid analysis revealed greater than 6000 WBCs and no crystals were noted.  Discharged home on p.o. Augmentin 875/125 twice daily x2 weeks, estimated end date 6/21/2019, may need to extend depending upon clinical response.    Hospital records reviewed    Today, 6/19/2019: Patient reports feeling well and has been tolerating the p.o. Augmentin without adverse effect.  Denies feeling generally ill, fevers/chills, general malaise, headache, n/v/d, abdominal pain, chest pain or shortness of breath.  Has a follow-up with Dr. Harris this Friday.  States he is not sure how the surgical site looks as he has not uncovered it, but he has not noticed any drainage seeping through the dressing.  He notes that he continues to have right knee swelling that comes and goes, improves with ibuprofen.  States that he continues to work with physical therapy.  Has intermittent RLE pain that is described as being achy.  Notes it is worse with activity, but improves with rest.  He also notes that he has chronic low back pain.  Continues to smoke 1/2-1 PPD, stating that he has not been smoking as much lately because he does not have the money to afford cigarettes.  Does not check his blood sugars as he does not have a glucometer.      Review of Systems   Constitutional: Negative for chills and fever.   Respiratory: Negative for shortness of breath.    Cardiovascular: Positive for leg swelling (Right knee). Negative for chest pain.   Gastrointestinal: Negative for abdominal pain, constipation, diarrhea, nausea and vomiting.   Genitourinary: Negative for dysuria and hematuria.   Musculoskeletal: Positive for back pain, joint pain and myalgias.   Skin: Negative for rash.   Neurological:  "Negative for sensory change and headaches.   Psychiatric/Behavioral: The patient is not nervous/anxious.        Past Medical History:   Diagnosis Date   • Diabetes (HCC)    • DVT (deep venous thrombosis) (Formerly [facility])    • Hypertension        History reviewed. No pertinent family history.    Social History   Substance Use Topics   • Smoking status: Current Every Day Smoker     Packs/day: 1.00     Types: Cigarettes   • Smokeless tobacco: Former User   • Alcohol use No       Allergies: Patient has no known allergies.    Pt's medication and problem list reviewed.     Objective:     PE:  /68   Pulse 86   Temp 36.4 °C (97.6 °F) (Temporal)   Ht 1.88 m (6' 2\")   Wt 104.3 kg (230 lb)   SpO2 98%   BMI 29.53 kg/m²     Vital signs reviewed    Constitutional: Appears well-developed and well-nourished. No acute distress.  Disheveled.  Smells like cigarette smoke.      Eyes: Conjunctivae and EOM are normal. Pupils are equal, round, and reactive to light.   ENMT: Poor dentition-edentulous.  Oropharynx is clear and moist.  Neck: Trachea midline. Neck supple. No masses.  No JVD.  No cervical lymphadenopathy.  Respiratory/chest: No respiratory distress, unlabored respiratory effort.  Lungs clear to auscultation bilaterally. No wheezes or rales.  Cardiovascular: Normal rate, regular rhythm.  Normal heart sounds on auscultation- no murmur, gallop, or friction rub. Intact distal pedal pulses.  Trace edema to right knee.   Abdomen: Soft, non tender, non-distended. BS + x 4. No masses or hernias.   Musculoskeletal: Shuffling gait, crutches used for ambulatory assistance.  Limited range of motion to RLE due to leg brace.  No clubbing or cyanosis.  Right calf- sutures in place, well approximated, no active drainage, no erythema to surrounding tissue, no maceration, nontender to palpation, no induration or fluctuance.  Skin: Warm and dry.  No visible rashes or lesions.  Neurological: No cranial nerve deficit. Coordination normal.  " Sensation intact.  Psychiatric: Alert and oriented to person, place, and time. Normal mood, calm affect.  Normal behavior and judgment. Memory intact.    Assessment and Plan:   The following treatment plan was discussed with patient at length:    1. Abscess of calf      -Finish p.o. Augmentin on 6/21/2019 as directed.  No need for additional antibiotics as there are no active signs of infection.  Monitor for s/sx of worsening off abx: increased redness, pain, swelling, drainage, breakdown of surgical site, fevers, chills, general malaise, etc.  Notify ID or go to ER should these s/sx occur.  -Follow-up with Dr. Harris as directed.   2. Pyogenic arthritis of right knee joint, due to unspecified organism (HCC)      As above.   3. Diabetes mellitus type 2 in nonobese (HCC)      HgA1C 7.0% on 6/8/19.  Continue to monitor blood sugars closely as DM will impair wound healing and can worsen infection.   4. Tobacco dependence      Tobacco cessation encouraged as it can hinder wound healing and resolution of infection.     Follow up: PRN, RTC sooner if needed. FU with PCP for ongoing chronic medical conditions.     HILTON Dunn.       Please note that this dictation was created using voice recognition software. I have  worked with technical experts from [email or web address] to optimize the interface.  I have made every reasonable attempt to correct obvious errors, but there may be errors of grammar and possibly content that I did not discover before finalizing the note.

## 2019-06-20 ENCOUNTER — HOME CARE VISIT (OUTPATIENT)
Dept: HOME HEALTH SERVICES | Facility: HOME HEALTHCARE | Age: 54
End: 2019-06-20
Payer: COMMERCIAL

## 2019-06-24 ENCOUNTER — HOME CARE VISIT (OUTPATIENT)
Dept: HOME HEALTH SERVICES | Facility: HOME HEALTHCARE | Age: 54
End: 2019-06-24
Payer: COMMERCIAL

## 2019-06-27 ENCOUNTER — HOME CARE VISIT (OUTPATIENT)
Dept: HOME HEALTH SERVICES | Facility: HOME HEALTHCARE | Age: 54
End: 2019-06-27
Payer: COMMERCIAL

## 2019-07-01 ENCOUNTER — HOME CARE VISIT (OUTPATIENT)
Dept: HOME HEALTH SERVICES | Facility: HOME HEALTHCARE | Age: 54
End: 2019-07-01
Payer: COMMERCIAL

## 2019-07-04 ENCOUNTER — HOME CARE VISIT (OUTPATIENT)
Dept: HOME HEALTH SERVICES | Facility: HOME HEALTHCARE | Age: 54
End: 2019-07-04
Payer: COMMERCIAL

## 2019-07-05 ENCOUNTER — HOME CARE VISIT (OUTPATIENT)
Dept: HOME HEALTH SERVICES | Facility: HOME HEALTHCARE | Age: 54
End: 2019-07-05
Payer: COMMERCIAL

## 2019-07-08 ENCOUNTER — HOME CARE VISIT (OUTPATIENT)
Dept: HOME HEALTH SERVICES | Facility: HOME HEALTHCARE | Age: 54
End: 2019-07-08

## 2019-07-08 ENCOUNTER — HOME CARE VISIT (OUTPATIENT)
Dept: HOME HEALTH SERVICES | Facility: HOME HEALTHCARE | Age: 54
End: 2019-07-08
Payer: COMMERCIAL

## 2019-07-09 ENCOUNTER — HOME CARE VISIT (OUTPATIENT)
Dept: HOME HEALTH SERVICES | Facility: HOME HEALTHCARE | Age: 54
End: 2019-07-09
Payer: COMMERCIAL

## 2019-07-09 ASSESSMENT — ENCOUNTER SYMPTOMS: POOR JUDGMENT: 1

## 2019-07-09 ASSESSMENT — ACTIVITIES OF DAILY LIVING (ADL)
OASIS_M1830: 01
HOME_HEALTH_OASIS: 01

## 2022-10-27 NOTE — CARE PLAN
Problem: Safety  Goal: Will remain free from injury  Outcome: PROGRESSING AS EXPECTED   Pt educated on need to call before getting out of bed. Pt verbalizes understanding. Treaded socks on pt. Bed locked. DME in bathroom. Room safety check. Bed in lowest position. Pt calls for help appropriately on call light. Call light with in reach. Hourly rounding in place.        Problem: Knowledge Deficit  Goal: Knowledge of the prescribed therapeutic regimen will improve  Outcome: PROGRESSING AS EXPECTED  Reviewed POC including safety, mobility, pain management, medication administration, DVT prophylaxis, and discharge.  Pt has no needs or concerns at this time.        Pt discharged by ED Provider, independent of nursing staff        Татьяна Engle RN  10/27/22 1233

## (undated) DEVICE — PACK LOWER EXTREMITY - (2/CA)

## (undated) DEVICE — PAD LAP STERILE 18 X 18 - (5/PK 40PK/CA)

## (undated) DEVICE — PADDING CAST 4 IN STERILE - 4 X 4 YDS (24/CA)

## (undated) DEVICE — SODIUM CHL. IRRIGATION 0.9% 3000ML (4EA/CA 65CA/PF)

## (undated) DEVICE — WATER IRRIG. STER. 1500 ML - (9/CA)

## (undated) DEVICE — DRESSING ABDOMINAL PAD STERILE 8 X 10" (360EA/CA)"

## (undated) DEVICE — SLEEVE, VASO, THIGH, MED

## (undated) DEVICE — KIT ROOM DECONTAMINATION

## (undated) DEVICE — SET EXTENSION WITH 2 PORTS (48EA/CA) ***PART #2C8610 IS A SUBSTITUTE*****

## (undated) DEVICE — ELECTRODE 850 FOAM ADHESIVE - HYDROGEL RADIOTRNSPRNT (50/PK)

## (undated) DEVICE — LACTATED RINGERS INJ 1000 ML - (14EA/CA 60CA/PF)

## (undated) DEVICE — SENSOR SPO2 NEO LNCS ADHESIVE (20/BX) SEE USER NOTES

## (undated) DEVICE — SUTURE 2-0 PDS II CT-2 - (36/BX)

## (undated) DEVICE — ELECTRODE DUAL RETURN W/ CORD - (50/PK)

## (undated) DEVICE — PROTECTOR ULNA NERVE - (36PR/CA)

## (undated) DEVICE — CANISTER SUCTION 3000ML MECHANICAL FILTER AUTO SHUTOFF MEDI-VAC NONSTERILE LF DISP  (40EA/CA)

## (undated) DEVICE — SUCTION INSTRUMENT YANKAUER BULBOUS TIP W/O VENT (50EA/CA)

## (undated) DEVICE — SUTURE 3-0 ETHILON FS-1 - (36/BX) 30 INCH

## (undated) DEVICE — SUTURE 2-0 VICRYL PLUS CT-1 36 (36PK/BX)"

## (undated) DEVICE — MASK ANESTHESIA ADULT  - (100/CA)

## (undated) DEVICE — KIT ANESTHESIA W/CIRCUIT & 3/LT BAG W/FILTER (20EA/CA)

## (undated) DEVICE — SET LEADWIRE 5 LEAD BEDSIDE DISPOSABLE ECG (1SET OF 5/EA)

## (undated) DEVICE — DRESSING PETROLEUM GAUZE 5 X 9" (50EA/BX 4BX/CA)"

## (undated) DEVICE — TIP INTPLS HFLO ML ORFC BTRY - (12/CS)  FOR SURGILAV

## (undated) DEVICE — BANDAGE ELASTIC 6 HONEYCOMB - 6X5YD LF (20/CA)"

## (undated) DEVICE — GLOVE BIOGEL INDICATOR SZ 7.5 SURGICAL PF LTX - (50PR/BX 4BX/CA)

## (undated) DEVICE — DRAPE SURG STERI-DRAPE 7X11OD - (40EA/CA)

## (undated) DEVICE — WRAP COBAN SELF-ADHERENT 6 IN X  5YDS STERILE TAN (12/CA)

## (undated) DEVICE — SUTURE 0 VICRYL PLUS CT-1 - 36 INCH (36/BX)

## (undated) DEVICE — TUBING CLEARLINK DUO-VENT - C-FLO (48EA/CA)

## (undated) DEVICE — SUTURE GENERAL

## (undated) DEVICE — BANDAGE ROLL STERILE BULKEE 4.5 IN X 4 YD (100EA/CA)

## (undated) DEVICE — NEPTUNE 4 PORT MANIFOLD - (20/PK)

## (undated) DEVICE — TRAY SKIN SCRUB PVP WET (20EA/CA) PART #DYND70356 DISCONTINUED

## (undated) DEVICE — HANDPIECE 10FT INTPLS SCT PLS IRRIGATION HAND CONTROL SET (6/PK)

## (undated) DEVICE — SODIUM CHL IRRIGATION 0.9% 1000ML (12EA/CA)

## (undated) DEVICE — BOVIE BLADE COATED - (50/PK)

## (undated) DEVICE — STAPLER SKIN DISP - (6/BX 10BX/CA) VISISTAT

## (undated) DEVICE — SET IRRIGATION CYSTOSCOPY TUBE L80 IN (20EA/CA)

## (undated) DEVICE — GOWN WARMING STANDARD FLEX - (30/CA)

## (undated) DEVICE — BANDAGE ELASTIC 4 HONEYCOMB - 4"X5YD LF (20/CA)"

## (undated) DEVICE — HEAD HOLDER JUNIOR/ADULT

## (undated) DEVICE — SPONGE GAUZE STER 4X4 8-PL - (2/PK 50PK/BX 12BX/CS)

## (undated) DEVICE — WRAP SELF ADHERING 3 IN X 5YDS NON STERILE TAN (1/EA)